# Patient Record
Sex: MALE | Race: WHITE | NOT HISPANIC OR LATINO | Employment: UNEMPLOYED | URBAN - METROPOLITAN AREA
[De-identification: names, ages, dates, MRNs, and addresses within clinical notes are randomized per-mention and may not be internally consistent; named-entity substitution may affect disease eponyms.]

---

## 2018-09-24 LAB — PHQ9 SCORE: 13

## 2018-10-04 LAB — PHQ9 SCORE: 14

## 2018-10-12 LAB — PHQ9 SCORE: 14

## 2019-02-25 LAB — PHQ9 SCORE: 12

## 2019-03-06 LAB — PHQ9 SCORE: 13

## 2019-04-19 LAB — PHQ9 SCORE: 11

## 2019-05-10 LAB — PHQ9 SCORE: 12

## 2019-05-24 ENCOUNTER — TRANSFERRED RECORDS (OUTPATIENT)
Dept: HEALTH INFORMATION MANAGEMENT | Facility: CLINIC | Age: 34
End: 2019-05-24

## 2019-05-24 LAB — PHQ9 SCORE: 11

## 2019-05-28 DIAGNOSIS — G47.30 SLEEP APNEA: Primary | ICD-10-CM

## 2019-05-29 ENCOUNTER — OFFICE VISIT (OUTPATIENT)
Dept: FAMILY MEDICINE | Facility: CLINIC | Age: 34
End: 2019-05-29
Payer: MEDICARE

## 2019-05-29 VITALS
WEIGHT: 296 LBS | DIASTOLIC BLOOD PRESSURE: 77 MMHG | RESPIRATION RATE: 18 BRPM | HEIGHT: 69 IN | SYSTOLIC BLOOD PRESSURE: 127 MMHG | HEART RATE: 69 BPM | OXYGEN SATURATION: 96 % | TEMPERATURE: 97.9 F | BODY MASS INDEX: 43.84 KG/M2

## 2019-05-29 DIAGNOSIS — Z00.00 ROUTINE HISTORY AND PHYSICAL EXAMINATION OF ADULT: Primary | ICD-10-CM

## 2019-05-29 DIAGNOSIS — I10 ESSENTIAL HYPERTENSION: ICD-10-CM

## 2019-05-29 DIAGNOSIS — I73.00 RAYNAUD'S PHENOMENON WITHOUT GANGRENE: ICD-10-CM

## 2019-05-29 DIAGNOSIS — E66.01 MORBID OBESITY (H): ICD-10-CM

## 2019-05-29 DIAGNOSIS — J35.8 TONSIL STONE: ICD-10-CM

## 2019-05-29 DIAGNOSIS — R53.83 FATIGUE, UNSPECIFIED TYPE: ICD-10-CM

## 2019-05-29 DIAGNOSIS — F41.8 DEPRESSION WITH ANXIETY: ICD-10-CM

## 2019-05-29 DIAGNOSIS — H61.22 IMPACTED CERUMEN OF LEFT EAR: ICD-10-CM

## 2019-05-29 LAB
ALBUMIN SERPL-MCNC: 3.6 G/DL (ref 3.4–5)
ALP SERPL-CCNC: 98 U/L (ref 40–150)
ALT SERPL W P-5'-P-CCNC: 36 U/L (ref 0–70)
ANION GAP SERPL CALCULATED.3IONS-SCNC: 8 MMOL/L (ref 3–14)
AST SERPL W P-5'-P-CCNC: 16 U/L (ref 0–45)
BASOPHILS # BLD AUTO: 0 10E9/L (ref 0–0.2)
BASOPHILS NFR BLD AUTO: 0.2 %
BILIRUB SERPL-MCNC: 0.3 MG/DL (ref 0.2–1.3)
BUN SERPL-MCNC: 13 MG/DL (ref 7–30)
CALCIUM SERPL-MCNC: 8.6 MG/DL (ref 8.5–10.1)
CHLORIDE SERPL-SCNC: 106 MMOL/L (ref 94–109)
CHOLEST SERPL-MCNC: 115 MG/DL
CO2 SERPL-SCNC: 26 MMOL/L (ref 20–32)
CREAT SERPL-MCNC: 1.14 MG/DL (ref 0.66–1.25)
DIFFERENTIAL METHOD BLD: NORMAL
EOSINOPHIL # BLD AUTO: 0.3 10E9/L (ref 0–0.7)
EOSINOPHIL NFR BLD AUTO: 2.9 %
ERYTHROCYTE [DISTWIDTH] IN BLOOD BY AUTOMATED COUNT: 13.5 % (ref 10–15)
GFR SERPL CREATININE-BSD FRML MDRD: 85 ML/MIN/{1.73_M2}
GLUCOSE SERPL-MCNC: 94 MG/DL (ref 70–99)
HCT VFR BLD AUTO: 42 % (ref 40–53)
HDLC SERPL-MCNC: 31 MG/DL
HGB BLD-MCNC: 14.3 G/DL (ref 13.3–17.7)
LDLC SERPL CALC-MCNC: 58 MG/DL
LYMPHOCYTES # BLD AUTO: 2.8 10E9/L (ref 0.8–5.3)
LYMPHOCYTES NFR BLD AUTO: 30.4 %
MCH RBC QN AUTO: 30.2 PG (ref 26.5–33)
MCHC RBC AUTO-ENTMCNC: 34 G/DL (ref 31.5–36.5)
MCV RBC AUTO: 89 FL (ref 78–100)
MONOCYTES # BLD AUTO: 0.7 10E9/L (ref 0–1.3)
MONOCYTES NFR BLD AUTO: 7.5 %
NEUTROPHILS # BLD AUTO: 5.4 10E9/L (ref 1.6–8.3)
NEUTROPHILS NFR BLD AUTO: 59 %
NONHDLC SERPL-MCNC: 84 MG/DL
PLATELET # BLD AUTO: 246 10E9/L (ref 150–450)
POTASSIUM SERPL-SCNC: 3.9 MMOL/L (ref 3.4–5.3)
PROT SERPL-MCNC: 6.9 G/DL (ref 6.8–8.8)
RBC # BLD AUTO: 4.74 10E12/L (ref 4.4–5.9)
SODIUM SERPL-SCNC: 140 MMOL/L (ref 133–144)
T4 FREE SERPL-MCNC: 0.95 NG/DL (ref 0.76–1.46)
TRIGL SERPL-MCNC: 132 MG/DL
TSH SERPL DL<=0.005 MIU/L-ACNC: 4.86 MU/L (ref 0.4–4)
WBC # BLD AUTO: 9.2 10E9/L (ref 4–11)

## 2019-05-29 PROCEDURE — 99385 PREV VISIT NEW AGE 18-39: CPT | Performed by: INTERNAL MEDICINE

## 2019-05-29 PROCEDURE — 36415 COLL VENOUS BLD VENIPUNCTURE: CPT | Performed by: INTERNAL MEDICINE

## 2019-05-29 PROCEDURE — 80061 LIPID PANEL: CPT | Performed by: INTERNAL MEDICINE

## 2019-05-29 PROCEDURE — 84443 ASSAY THYROID STIM HORMONE: CPT | Performed by: INTERNAL MEDICINE

## 2019-05-29 PROCEDURE — 85025 COMPLETE CBC W/AUTO DIFF WBC: CPT | Performed by: INTERNAL MEDICINE

## 2019-05-29 PROCEDURE — 84439 ASSAY OF FREE THYROXINE: CPT | Performed by: INTERNAL MEDICINE

## 2019-05-29 PROCEDURE — 80053 COMPREHEN METABOLIC PANEL: CPT | Performed by: INTERNAL MEDICINE

## 2019-05-29 RX ORDER — ATORVASTATIN CALCIUM 40 MG/1
TABLET, FILM COATED ORAL
Refills: 5 | COMMUNITY
Start: 2019-02-14 | End: 2020-01-22

## 2019-05-29 RX ORDER — AMLODIPINE BESYLATE 5 MG/1
5 TABLET ORAL DAILY
Qty: 90 TABLET | Refills: 1 | Status: SHIPPED | OUTPATIENT
Start: 2019-05-29 | End: 2019-12-20

## 2019-05-29 ASSESSMENT — ENCOUNTER SYMPTOMS
VOMITING: 0
COUGH: 0
MYALGIAS: 0
DYSPHORIC MOOD: 1
HEADACHES: 0
DIFFICULTY URINATING: 0
TROUBLE SWALLOWING: 0
CONSTIPATION: 0
BACK PAIN: 0
SLEEP DISTURBANCE: 0
NECK PAIN: 0
FEVER: 0
PALPITATIONS: 1
LIGHT-HEADEDNESS: 0
ABDOMINAL PAIN: 0
SORE THROAT: 0
CHILLS: 0
NUMBNESS: 0
SHORTNESS OF BREATH: 0
DIZZINESS: 1
DIARRHEA: 0
NAUSEA: 0
EYE PAIN: 0
FATIGUE: 1
NERVOUS/ANXIOUS: 1
BLOOD IN STOOL: 0
ARTHRALGIAS: 0

## 2019-05-29 ASSESSMENT — PATIENT HEALTH QUESTIONNAIRE - PHQ9: SUM OF ALL RESPONSES TO PHQ QUESTIONS 1-9: 9

## 2019-05-29 ASSESSMENT — ACTIVITIES OF DAILY LIVING (ADL): CURRENT_FUNCTION: NO ASSISTANCE NEEDED

## 2019-05-29 ASSESSMENT — MIFFLIN-ST. JEOR: SCORE: 2283.03

## 2019-05-29 NOTE — LETTER
My Depression Action Plan  Name: Juanjo Silverio   Date of Birth 1985  Date: 5/29/2019    My doctor: Filemon Peraza   My clinic: [facility]  [address] [facility] [id number] [phone]          GREEN    ZONE   Good Control    What it looks like:     Things are going generally well. You have normal up s and down s. You may even feel depressed from time to time, but bad moods usually last less than a day.   What you need to do:  1. Continue to care for yourself (see self care plan)  2. Check your depression survival kit and update it as needed  3. Follow your physician s recommendations including any medication.  4. Do not stop taking medication unless you consult with your physician first.           YELLOW         ZONE Getting Worse    What it looks like:     Depression is starting to interfere with your life.     It may be hard to get out of bed; you may be starting to isolate yourself from others.    Symptoms of depression are starting to last most all day and this has happened for several days.     You may have suicidal thoughts but they are not constant.   What you need to do:     1. Call your care team, your response to treatment will improve if you keep your care team informed of your progress. Yellow periods are signs an adjustment may need to be made.     2. Continue your self-care, even if you have to fake it!    3. Talk to someone in your support network    4. Open up your depression survival kit           RED    ZONE Medical Alert - Get Help    What it looks like:     Depression is seriously interfering with your life.     You may experience these or other symptoms: You can t get out of bed most days, can t work or engage in other necessary activities, you have trouble taking care of basic hygiene, or basic responsibilities, thoughts of suicide or death that will not go away, self-injurious behavior.     What you need to do:  1. Call your care  team and request a same-day appointment. If they are not available (weekends or after hours) call your local crisis line, emergency room or 911.            Depression Self Care Plan / Survival Kit    Self-Care for Depression  Here s the deal. Your body and mind are really not as separate as most people think.  What you do and think affects how you feel and how you feel influences what you do and think. This means if you do things that people who feel good do, it will help you feel better.  Sometimes this is all it takes.  There is also a place for medication and therapy depending on how severe your depression is, so be sure to consult with your medical provider and/ or Behavioral Health Consultant if your symptoms are worsening or not improving.     In order to better manage my stress, I will:    Exercise  Get some form of exercise, every day. This will help reduce pain and release endorphins, the  feel good  chemicals in your brain. This is almost as good as taking antidepressants!  This is not the same as joining a gym and then never going! (they count on that by the way ) It can be as simple as just going for a walk or doing some gardening, anything that will get you moving.      Hygiene   Maintain good hygiene (Get out of bed in the morning, Make your bed, Brush your teeth, Take a shower, and Get dressed like you were going to work, even if you are unemployed).  If your clothes don't fit try to get ones that do.    Diet  I will strive to eat foods that are good for me, drink plenty of water, and avoid excessive sugar, caffeine, alcohol, and other mood-altering substances.  Some foods that are helpful in depression are: complex carbohydrates, B vitamins, flaxseed, fish or fish oil, fresh fruits and vegetables.    Psychotherapy  I agree to participate in Individual Therapy (if recommended).    Medication  If prescribed medications, I agree to take them.  Missing doses can result in serious side effects.  I  understand that drinking alcohol, or other illicit drug use, may cause potential side effects.  I will not stop my medication abruptly without first discussing it with my provider.    Staying Connected With Others  I will stay in touch with my friends, family members, and my primary care provider/team.    Use your imagination  Be creative.  We all have a creative side; it doesn t matter if it s oil painting, sand castles, or mud pies! This will also kick up the endorphins.    Witness Beauty  (AKA stop and smell the roses) Take a look outside, even in mid-winter. Notice colors, textures. Watch the squirrels and birds.     Service to others  Be of service to others.  There is always someone else in need.  By helping others we can  get out of ourselves  and remember the really important things.  This also provides opportunities for practicing all the other parts of the program.    Humor  Laugh and be silly!  Adjust your TV habits for less news and crime-drama and more comedy.    Control your stress  Try breathing deep, massage therapy, biofeedback, and meditation. Find time to relax each day.     My support system    Clinic Contact:  Phone number:    Contact 1:  Phone number:    Contact 2:  Phone number:    Faith/:  Phone number:    Therapist:  Phone number:    Local crisis center:    Phone number:    Other community support:  Phone number:

## 2019-05-29 NOTE — PATIENT INSTRUCTIONS
Monitor your blood pressure twice a day (once you wake up and before bedtime).  Call doctor if:  -- your blood pressure for the top/upper number is greater than 140 or less than 90  -- your blood pressure for the bottom/lower number is greater than 90 or less than 60    Write your blood pressure readings on a small notebook and bring this notebook along with your blood pressure machine to your clinic visits.    Proceed with your Sleep Study.    Maintain low fat/calorie diet and regular exercise.    Limit sodium intake to 2500mg per day.    Use over the counter earwax softener (ex. Debrox) as directed on the box. Do not insert any Qtips or any other object into your ears. Return to clinic for a nurse-only visit 1 week after for ear flushing (do not attempt to clean your ears out on your own).    Follow up yearly or as needed.        Patient Education   Personalized Prevention Plan  You are due for the preventive services outlined below.  Your care team is available to assist you in scheduling these services.  If you have already completed any of these items, please share that information with your care team to update in your medical record.  Health Maintenance Due   Topic Date Due     Diptheria Tetanus Pertussis (DTAP/TDAP/TD) Vaccine (3 - Td) 06/10/2016     PHQ-2  01/01/2019        Patient Education   Personalized Prevention Plan  You are due for the preventive services outlined below.  Your care team is available to assist you in scheduling these services.  If you have already completed any of these items, please share that information with your care team to update in your medical record.  Health Maintenance Due   Topic Date Due     Diptheria Tetanus Pertussis (DTAP/TDAP/TD) Vaccine (3 - Td) 06/10/2016     PHQ-2  01/01/2019     Your Health Risk Assessment indicates you feel you are not in good health    A healthy lifestyle helps keep the body fit and the mind alert. It helps protect you from disease, helps you  fight disease, and helps prevent chronic disease (disease that doesn't go away) from getting worse. This is important as you get older and begin to notice twinges in muscles and joints and a decline in the strength and stamina you once took for granted. A healthy lifestyle includes good healthcare, good nutrition, weight control, recreation, and regular exercise. Avoid harmful substances and do what you can to keep safe. Another part of a healthy lifestyle is stay mentally active and socially involved.    Good healthcare     Have a wellness visit every year.     If you have new symptoms, let us know right away. Don't wait until the next checkup.     Take medicines exactly as prescribed and keep your medicines in a safe place. Tell us if your medicine causes problems.   Healthy diet and weight control     Eat 3 or 4 small, nutritious, low-fat, high-fiber meals a day. Include a variety of fruits, vegetables, and whole-grain foods.     Make sure you get enough calcium in your diet. Calcium, vitamin D, and exercise help prevent osteoporosis (bone thinning).     If you live alone, try eating with others when you can. That way you get a good meal and have company while you eat it.     Try to keep a healthy weight. If you eat more calories than your body uses for energy, it will be stored as fat and you will gain weight.     Recreation   Recreation is not limited to sports and team events. It includes any activity that provides relaxation, interest, enjoyment, and exercise. Recreation provides an outlet for physical, mental, and social energy. It can give a sense of worth and achievement. It can help you stay healthy.    Mental Exercise and Social Involvement  Mental and emotional health is as important as physical health. Keep in touch with friends and family. Stay as active as possible. Continue to learn and challenge yourself.   Things you can do to stay mentally active are:    Learn something new, like a foreign  language or musical instrument.     Play SCRABBLE or do crossword puzzles. If you cannot find people to play these games with you at home, you can play them with others on your computer through the Internet.     Join a games club--anything from card games to chess or checkers or lawn bowling.     Start a new hobby.     Go back to school.     Volunteer.     Read.   Keep up with world events.    Exercise for a Healthier Heart     Exercise with a friend. When activity is fun, you're more likely to stick with it.   You may wonder how you can improve the health of your heart. If you re thinking about exercise, you re on the right track. You don t need to become an athlete, but you do need a certain amount of brisk exercise to help strengthen your heart. If you have been diagnosed with a heart condition, your doctor may recommend exercise to help stabilize your condition. To help make exercise a habit, choose safe, fun activities.  Be sure to check with your healthcare provider before starting an exercise program.   Why exercise?  Exercising regularly offers many healthy rewards. It can help you do all of the following:    Improve your blood cholesterol level to help prevent further heart trouble    Lower your blood pressure to help prevent a stroke or heart attack    Control diabetes, or reduce your risk of getting this disease    Improve your heart and lung function    Reach and maintain a healthy weight    Make your muscles stronger and more limber so you can stay active    Prevent falls and fractures by slowing the loss of bone mass (osteoporosis)    Manage stress better    Reduce your blood pressure    Improve your sense of self and your body image  Exercise tips  Ease into your routine. Set small goals. Then build on them.  Exercise on most days. Aim for a total of 150 or more minutes of moderate to  vigorous intensity activity each week. Consider 40 minutes, 3 to 4 times a week. For best results, activity should  last for 40 minutes on average. It is OK to work up to the 40 minute period over time. Examples of moderate-intensity activity is walking 1 mile in 15 minutes or 30 to 45 minutes of yard work.  Step up your daily activity level. Along with your exercise program, try being more active throughout the day. Walk instead of drive. Do more household tasks or yard work.  Choose one or more activities you enjoy. Walking is one of the easiest things you can do. You can also try swimming, riding a bike, dancing, or taking an exercise class.  Stop exercising and call your doctor if you:    Have chest pain or feel dizzy or lightheaded    Feel burning, tightness, pressure, or heaviness in your chest, neck, shoulders, back, or arms    Have unusual shortness of breath    Have increased joint or muscle pain    Have palpitations or an irregular heartbeat   Date Last Reviewed: 5/1/2016 2000-2018 [employer]. [address] [place] [address]. All rights reserved. This information is not intended as a substitute for professional medical care. Always follow your healthcare professional's instructions.          Understanding USDA MyPlate  The USDA (U.S. Department of Agriculture) has guidelines to help you make healthy food choices. These are called MyPlate. MyPlate shows the food groups that make up healthy meals using the image of a place setting. Before you eat, think about the healthiest choices for what to put onto your plate or into your cup or bowl. To learn more about building a healthy plate, visit www.choosemyplate.gov.    The food groups    Fruits. Any fruit or 100% fruit juice counts as part of the Fruit Group. Fruits may be fresh, canned, frozen, or dried, and may be whole, cut-up, or pureed. Make half your plate fruits and vegetables.    Vegetables. Any vegetable or 100% vegetable juice counts as a member of the Vegetable Group. Vegetables may be fresh, frozen, canned, or dried. They can be served  raw or cooked and may be whole, cut-up, or mashed. Make half your plate fruits and vegetables.    Grains. All foods made from grains are part of the Grains Group. These include wheat, rice, oats, cornmeal, and barley such as bread, pasta, oatmeal, cereal, tortillas, and grits. Grains should be no more than a quarter of your plate. At least half of your grains should be whole grains.    Protein. This group includes meat, poultry, seafood, beans and peas, eggs, processed soy products (like tofu), nuts (including nut butters), and seeds. Make protein choices no more than a quarter of your plate. Meat and poultry choices should be lean or low fat.    Dairy. All fluid milk products and foods made from milk that contain calcium, like yogurt and cheese, are part of the Dairy Group. (Foods that have little calcium, such as cream, butter, and cream cheese, are not part of the group.) Most dairy choices should be low-fat or fat-free.    Oils. These are fats that are liquid at room temperature. They include canola, corn, olive, soybean, and sunflower oil. Foods that are mainly oil include mayonnaise, certain salad dressings, and soft margarines. You should have only 5 to 7 teaspoons of oils a day. You probably already get this much from the food you eat.  Date Last Reviewed: 8/1/2017 2000-2018 [employer]. [address] [place] [address]. All rights reserved. This information is not intended as a substitute for professional medical care. Always follow your healthcare professional's instructions.        Your Health Risk Assessment indicates you feel you are not in good emotional health.    Recreation   Recreation is not limited to sports and team events. It includes any activity that provides relaxation, interest, enjoyment, and exercise. Recreation provides an outlet for physical, mental, and social energy. It can give a sense of worth and achievement. It can help you stay healthy.    Mental Exercise  and Social Involvement  Mental and emotional health is as important as physical health. Keep in touch with friends and family. Stay as active as possible. Continue to learn and challenge yourself.   Things you can do to stay mentally active are:    Learn something new, like a foreign language or musical instrument.     Play SCRABBLE or do crossword puzzles. If you cannot find people to play these games with you at home, you can play them with others on your computer through the Internet.     Join a games club--anything from card games to chess or checkers or lawn bowling.     Start a new hobby.     Go back to school.     Volunteer.     Read.   Keep up with world events.     Patient Education     Tips for a Low-Sodium Diet  If you have high blood pressure, heart failure, liver problems or kidney problems, you may need to watch your sodium intake. Too much sodium can cause thirst and shortness of breath. It can also make your body retain extra fluids.  You should limit the amount of sodium in your diet to mg per day.  Sodium is found in many foods. Most of our sodium comes from  processed  foods like canned soups, lunch meats and TV dinners.  A major source of sodium is salt, or sodium chloride. Salt is often used to preserve foods (extend their shelf life). We have gotten used to the taste of salt in our foods. When you start to limit your salt intake, you will notice the lack of salt. Give yourself time to adjust to the change.  Tips    To keep track of your sodium intake, write down the amount of sodium you eat for a of couple days. This gives you a good idea of which foods are high in sodium--and where you can cut back.    Do not add salt while cooking or at the table. In recipes, you can often use half the amount of salt without giving up flavor.    Do not add salt to water when making rice, pasta or potatoes.    Do not use lemon pepper--this is made with salt.    Use spices and herbs without the word  salt  in  their names. Use herb blends like Mrs. Shen.    When eating vegetables, meats, poultry or fish, choose fresh or frozen instead of canned foods.    Eat more homemade foods that are made from scratch. Avoid boxed rice, noodles or potato dishes--these often contain salty seasonings.    Choose foods with the least amount of packaging. These are often lower in sodium .    Read food labels to learn the sodium content for suggested serving sizes. Choose foods with the least amount of sodium.  ? Choose foods labeled  low sodium.  These have less than 140 mg of sodium per serving.    Always double-check serving sizes. If you eat two servings of a food, you will get twice as much sodium.    When you go out to eat, ask that foods be made without added salt. Ask for condiments on the side, so you can control how much you use.    You will find foods with less sodium if you shop along the outer walls of the grocery store.    Do not use a salt substitute without your doctor s okay. Some products (like New Lite Salt) contain potassium. If you are taking certain medicines, these products could raise the level of potassium in your blood.  High-sodium foods    Salt or kosher salt (one teaspoon = 2,300 mg of sodium)    Seasonings (lemon pepper, garlic salt, sea salt, seasoning salt, etc.)    Meat tenderizers and marinades    Packaged sauces or gravies    Snack foods (chips, crackers, pork rinds, salted nuts)    Cheese (natural and processed)    Cottage cheese    Fast-food and restaurant meals    Most canned vegetables and vegetable juices    Pickled and cured foods (pickles, olives, sauerkraut)    Condiments (BBQ sauce, soy sauce, teriyaki sauce, steak sauce, salad dressing, ketchup, etc.)    Canned or boxed side dishes, such as macaroni and cheese, ramen noodles, [employer] [place] [place] and Rice-A-Josr    Frozen meals with more than 500 mg of sodium per serving    Monosodium glutamate (MSG)    Processed meats (bologna, ham, wiseman) and  canned meats (SPAM, corned beef)    Soups and bouillon (canned, frozen or dried)    Canned tomato products (juice, spaghetti sauce, etc.)    Sports drinks such as Gatorade or Powerade    Foods covered in sauce, gravy or other coatings (broccoli with cheese sauce, chicken fingers, etc.)    Biscuits and refrigerated rolls or breads

## 2019-05-29 NOTE — PROGRESS NOTES
"  SUBJECTIVE:   Juanjo Silverio is a 34 year old male who presents for Preventive Visit.  {PVP to remind patient that this is not necessarily a physical exam; physical exam may or may not be done:434076::\"click delete button to remove this line now\"}  {PVP to inform patient that additional E&M charge may apply, if additional problems addressed:467646::\"click delete button to remove this line now\"}  Are you in the first 12 months of your Medicare Part B coverage?  { :644098::\"No\"}    Physical Health:    In general, how would you rate your overall physical health? { :009797}    Outside of work, how many days during the week do you exercise? { :294584}    Outside of work, approximately how many minutes a day do you exercise?{ :959537}    If you drink alcohol do you typically have >3 drinks per day or >7 drinks per week? { :515181}    Do you usually eat at least 4 servings of fruit and vegetables a day, include whole grains & fiber and avoid regularly eating high fat or \"junk\" foods? { :656910::\"Yes\"}    Do you have any problems taking medications regularly?  { :654204::\"No\"}    Do you have any side effects from medications? { :047251}    Needs assistance for the following daily activities: { :353856}    Which of the following safety concerns are present in your home?  { :505345::\"none identified\"}     Hearing impairment: { :241783}    In the past 6 months, have you been bothered by leaking of urine? { :681713}    Mental Health:    In general, how would you rate your overall mental or emotional health? { :537080}  PHQ-2 Score:      Do you feel safe in your environment? { :173219}    Do you have a Health Care Directive? { :851683}    Additional concerns to address?  {If YES, notify patient they may be responsible for a copay, coinsurance or deductible if the visit today includes services such as checking on a sore throat, having an x-ray or lab test, or treating and evaluating a new or existing condition " ":214800::\"No\"}    Fall risk:  { :327300}  {If any of the above assessments are answered yes, consider ordering appropriate referrals (Optional):524935::\"click delete button to remove this line now\"}  Cognitive Screening: { :979948}    {Do you have sleep apnea, excessive snoring or daytime drowsiness? (Optional):195860}    {Outside tests to abstract? :412842}    {additional problems to add (Optional):581169}    Reviewed and updated as needed this visit by clinical staff         Reviewed and updated as needed this visit by Provider        Social History     Tobacco Use     Smoking status: Former Smoker     Packs/day: 0.50     Last attempt to quit: 2008     Years since quittin.3     Smokeless tobacco: Never Used   Substance Use Topics     Alcohol use: Yes     Comment: very occasionally                           Current providers sharing in care for this patient include: {Rooming staff:  Please update Care Team in Rooming Activity, refresh this note and then delete this statement}  Patient Care Team:  Li Peraza as PCP - General    The following health maintenance items are reviewed in Epic and correct as of today:  Health Maintenance   Topic Date Due     DTAP/TDAP/TD IMMUNIZATION (3 - Td) 06/10/2016     PHQ-2  2019     ZOSTER IMMUNIZATION (1 of 2) 2037     MEDICARE ANNUAL WELLNESS VISIT  2052     HIV SCREENING  Completed     INFLUENZA VACCINE  Completed     IPV IMMUNIZATION  Completed     MENINGITIS IMMUNIZATION  Completed     {Chronicprobdata (Optional):361272}  {Decision Support (Optional):149991}    ROS:  {ROS COMP:849607}    OBJECTIVE:   There were no vitals taken for this visit. Estimated body mass index is 32.49 kg/m  as calculated from the following:    Height as of 13: 1.753 m (5' 9\").    Weight as of 14: 99.8 kg (220 lb).  EXAM:   {Exam :409898}    {Diagnostic Test Results (Optional):584654::\"Diagnostic Test Results:\",\"Labs reviewed in " "Epic\"}    ASSESSMENT / PLAN:   {Diag Picklist:850698}    End of Life Planning:  Patient currently has an advanced directive: { :179176}    COUNSELING:  {Medicare Counselin}    Estimated body mass index is 32.49 kg/m  as calculated from the following:    Height as of 13: 1.753 m (5' 9\").    Weight as of 14: 99.8 kg (220 lb).    {Weight Management Plan (ACO) Complete if BMI is abnormal-  Ages 18-64  BMI >24.9.  Age 65+ with BMI <23 or >30 (Optional):414891}     reports that he quit smoking about 11 years ago. He smoked 0.50 packs per day. He has never used smokeless tobacco.  {Tobacco Cessation -- Complete if patient is a smoker (Optional):400701}    Appropriate preventive services were discussed with this patient, including applicable screening as appropriate for cardiovascular disease, diabetes, osteopenia/osteoporosis, and glaucoma.  As appropriate for age/gender, discussed screening for colorectal cancer, prostate cancer, breast cancer, and cervical cancer. Checklist reviewing preventive services available has been given to the patient.    Reviewed patients plan of care and provided an AVS. The {CarePlan:952015} for Juanjo meets the Care Plan requirement. This Care Plan has been established and reviewed with the {PATIENT, FAMILY MEMBER, CAREGIVER:286345}.    Counseling Resources:  ATP IV Guidelines  Pooled Cohorts Equation Calculator  Breast Cancer Risk Calculator  FRAX Risk Assessment  ICSI Preventive Guidelines  Dietary Guidelines for Americans,   USDA's MyPlate  ASA Prophylaxis  Lung CA Screening    Kiran Giron MD  [facility]  "

## 2019-05-29 NOTE — PROGRESS NOTES
The patient was provided with suggestions to help him develop a healthy physical lifestyle.  He is at risk for lack of exercise and has been provided with information to increase physical activity for the benefit of his well-being.  The patient was counseled and encouraged to consider modifying their diet and eating habits. He was provided with information on recommended healthy diet options.  The patient was provided with suggestions to help him develop a healthy emotional lifestyle.

## 2019-05-29 NOTE — PROGRESS NOTES
"SUBJECTIVE:   Juanjo Silverio is a 34 year old male who presents for Preventive Visit.    Patient has been feeling more fatigued than usual.  No unusual symptoms observed other than palor of his fingers when exposed to cold.  Denies joints pains, rashes, weight loss.  He does have depression which as per PHQ9 is mild.  He is on Zoloft and also takes Trazodone at bedtime.  He does wake up not feeling rested and experiences daytime sleepiness.    Hypertension is controlled on Amlodipine.    Has noticed some whitish spots at his tonsils.  Denies sore throat or problems with swallowing.      Healthy Habits:    In general, how would you rate your overall health?  Fair    Frequency of exercise:  1 day/week    Duration of exercise:  15-30 minutes    Do you usually eat at least 4 servings of fruit and vegetables a day, include whole grains    & fiber and avoid regularly eating high fat or \"junk\" foods?  No    Taking medications regularly:  Yes    Barriers to taking medications:  Cost of medication    Medication side effects:  Not applicable    Ability to successfully perform activities of daily living:  No assistance needed    Home Safety:  No safety concerns identified    Hearing Impairment:  No hearing concerns    In the past 6 months, have you been bothered by leaking of urine?  No    In general, how would you rate your overall mental or emotional health?  Fair      PHQ-2 Total Score:    Additional concerns today:  Yes    Do you feel safe in your environment? Yes    Do you have a Health Care Directive? No: Advance care planning reviewed with patient; information given to patient to review.      Fall risk       Cognitive Screening   1) Repeat 3 items (Leader, Season, Table)    2) Clock draw: NORMAL  3) 3 item recall: Recalls 3 objects  Results: 3 items recalled: COGNITIVE IMPAIRMENT LESS LIKELY    Mini-CogTM Copyright NAVDEEP Vasquez. Licensed by the author for use in [facility]; reprinted with permission " (ryan@[email or web address]). All rights reserved.      Do you have sleep apnea, excessive snoring or daytime drowsiness?: yes    Reviewed and updated as needed this visit by clinical staff  Tobacco  Allergies  Meds  Problems  Med Hx  Surg Hx  Fam Hx         Reviewed and updated as needed this visit by Provider  Allergies  Meds  Problems  Med Hx  Surg Hx        Social History     Tobacco Use     Smoking status: Former Smoker     Packs/day: 0.50     Last attempt to quit: 2008     Years since quittin.4     Smokeless tobacco: Never Used   Substance Use Topics     Alcohol use: Yes     Comment: very occasionally       If you drink alcohol do you typically have >3 drinks per day or >7 drinks per week? No      Current providers sharing in care for this patient include:   Patient Care Team:  Li Peraza as PCP - General    The following health maintenance items are reviewed in Epic and correct as of today:  Health Maintenance   Topic Date Due     DTAP/TDAP/TD IMMUNIZATION (3 - Td) 06/10/2016     PHQ-9  2019     ZOSTER IMMUNIZATION (1 of 2) 2037     MEDICARE ANNUAL WELLNESS VISIT  2052     HIV SCREENING  Completed     DEPRESSION ACTION PLAN  Completed     PHQ-2  Completed     INFLUENZA VACCINE  Completed     IPV IMMUNIZATION  Completed     MENINGITIS IMMUNIZATION  Completed       Past Medical History:   Diagnosis Date     Depression with anxiety      Mood disorder (H) 6/10/2013     Other acne      Schizophrenia (H)      Suicidal ideation 2013       Past Surgical History:   Procedure Laterality Date     C APPENDECTOMY  11-    viral, not supperative at surgery     CIRCUMCISION      Adult 2013       Family History   Problem Relation Age of Onset     C.A.D. Maternal Grandfather         MI @ 43     Coronary Artery Disease Maternal Grandfather      Anxiety Disorder Father      Hypertension Father      Neurologic Disorder Mother         MS     Thyroid Disease Mother      "    hypothyroid     Coronary Artery Disease Mother         had heart attack and passed away from it     Schizophrenia Paternal Uncle      Bipolar Disorder Paternal Uncle      Substance Abuse Maternal Uncle      Diabetes No family hx of      Cerebrovascular Disease No family hx of      Colon Cancer No family hx of      Prostate Cancer No family hx of        Review of Systems   Constitutional: Positive for fatigue. Negative for chills and fever.   HENT: Negative for congestion, ear pain, hearing loss, sore throat and trouble swallowing.    Eyes: Negative for pain and visual disturbance.   Respiratory: Negative for cough and shortness of breath.    Cardiovascular: Positive for palpitations (when anxious). Negative for chest pain.   Gastrointestinal: Negative for abdominal pain, blood in stool, constipation, diarrhea, nausea and vomiting.   Genitourinary: Negative for difficulty urinating and testicular pain.   Musculoskeletal: Negative for arthralgias, back pain, myalgias and neck pain.   Skin: Negative for rash.   Neurological: Positive for dizziness (as part of withdrawal fom his psyche meds). Negative for light-headedness, numbness and headaches.   Psychiatric/Behavioral: Positive for dysphoric mood. Negative for sleep disturbance and suicidal ideas. The patient is nervous/anxious.        OBJECTIVE:   /77   Pulse 69   Temp 97.9  F (36.6  C) (Oral)   Resp 18   Ht 1.753 m (5' 9\")   Wt 134.3 kg (296 lb)   SpO2 96%   BMI 43.71 kg/m   Estimated body mass index is 43.71 kg/m  as calculated from the following:    Height as of this encounter: 1.753 m (5' 9\").    Weight as of this encounter: 134.3 kg (296 lb).    Physical Exam   Constitutional: He is oriented to person, place, and time. No distress.   HENT:   Right Ear: External ear normal.   Mouth/Throat: No oropharyngeal exudate.   Impacted cerumen left ear -- hard and deep earwax build up; tonsilliths seen   Eyes: Pupils are equal, round, and reactive to " light. Conjunctivae are normal.   Neck: No thyromegaly present.   Cardiovascular: Normal rate, regular rhythm and normal heart sounds.   Pulmonary/Chest: Effort normal and breath sounds normal. No respiratory distress.   Abdominal: Soft. There is no tenderness.   Genitourinary: No discharge found.   Musculoskeletal: Normal range of motion. He exhibits no edema or tenderness.   Lymphadenopathy:     He has no cervical adenopathy.   Neurological: He is alert and oriented to person, place, and time. Coordination normal.   Skin: No rash noted. No erythema. No pallor.   Psychiatric: He has a normal mood and affect.   Nursing note and vitals reviewed.      ASSESSMENT / PLAN:       ICD-10-CM    1. Routine history and physical examination of adult Z00.00    2. Essential hypertension I10 amLODIPine (NORVASC) 5 MG tablet   3. Depression with anxiety F41.8 sertraline (ZOLOFT) 50 MG tablet     TSH with free T4 reflex     DEPRESSION ACTION PLAN (DAP)   4. Fatigue, unspecified type R53.83 CBC with platelets differential     Comprehensive metabolic panel     TSH with free T4 reflex     T4 free     T4 free   5. Raynaud's phenomenon (by history or observed) -- no other sx's of connective tissue d/o I73.00     will pursue work up if preliminary tests are unremarkable   6. Tonsil stone -- explained to patient and reassurence given that it's not a serious condition J35.8    7. Impacted cerumen of left ear H61.22    8. Morbid obesity (H) E66.01 Lipid panel reflex to direct LDL Fasting       Patient Instructions     Monitor your blood pressure twice a day (once you wake up and before bedtime).  Call doctor if:  -- your blood pressure for the top/upper number is greater than 140 or less than 90  -- your blood pressure for the bottom/lower number is greater than 90 or less than 60    Write your blood pressure readings on a small notebook and bring this notebook along with your blood pressure machine to your clinic visits.    Proceed with  your Sleep Study.    Maintain low fat/calorie diet and regular exercise.    Limit sodium intake to 2500mg per day.    Use over the counter earwax softener (ex. Debrox) as directed on the box. Do not insert any Qtips or any other object into your ears. Return to clinic for a nurse-only visit 1 week after for ear flushing (do not attempt to clean your ears out on your own).    Follow up yearly or as needed.        Patient Education   Personalized Prevention Plan  You are due for the preventive services outlined below.  Your care team is available to assist you in scheduling these services.  If you have already completed any of these items, please share that information with your care team to update in your medical record.  Health Maintenance Due   Topic Date Due     Diptheria Tetanus Pertussis (DTAP/TDAP/TD) Vaccine (3 - Td) 06/10/2016     PHQ-2  01/01/2019        Patient Education   Personalized Prevention Plan  You are due for the preventive services outlined below.  Your care team is available to assist you in scheduling these services.  If you have already completed any of these items, please share that information with your care team to update in your medical record.  Health Maintenance Due   Topic Date Due     Diptheria Tetanus Pertussis (DTAP/TDAP/TD) Vaccine (3 - Td) 06/10/2016     PHQ-2  01/01/2019     Your Health Risk Assessment indicates you feel you are not in good health    A healthy lifestyle helps keep the body fit and the mind alert. It helps protect you from disease, helps you fight disease, and helps prevent chronic disease (disease that doesn't go away) from getting worse. This is important as you get older and begin to notice twinges in muscles and joints and a decline in the strength and stamina you once took for granted. A healthy lifestyle includes good healthcare, good nutrition, weight control, recreation, and regular exercise. Avoid harmful substances and do what you can to keep safe. Another  part of a healthy lifestyle is stay mentally active and socially involved.    Good healthcare     Have a wellness visit every year.     If you have new symptoms, let us know right away. Don't wait until the next checkup.     Take medicines exactly as prescribed and keep your medicines in a safe place. Tell us if your medicine causes problems.   Healthy diet and weight control     Eat 3 or 4 small, nutritious, low-fat, high-fiber meals a day. Include a variety of fruits, vegetables, and whole-grain foods.     Make sure you get enough calcium in your diet. Calcium, vitamin D, and exercise help prevent osteoporosis (bone thinning).     If you live alone, try eating with others when you can. That way you get a good meal and have company while you eat it.     Try to keep a healthy weight. If you eat more calories than your body uses for energy, it will be stored as fat and you will gain weight.     Recreation   Recreation is not limited to sports and team events. It includes any activity that provides relaxation, interest, enjoyment, and exercise. Recreation provides an outlet for physical, mental, and social energy. It can give a sense of worth and achievement. It can help you stay healthy.    Mental Exercise and Social Involvement  Mental and emotional health is as important as physical health. Keep in touch with friends and family. Stay as active as possible. Continue to learn and challenge yourself.   Things you can do to stay mentally active are:    Learn something new, like a foreign language or musical instrument.     Play SCRABBLE or do crossword puzzles. If you cannot find people to play these games with you at home, you can play them with others on your computer through the Internet.     Join a games club--anything from card games to chess or checkers or lawn bowling.     Start a new hobby.     Go back to school.     Volunteer.     Read.   Keep up with world events.    Exercise for a Healthier Heart      Exercise with a friend. When activity is fun, you're more likely to stick with it.   You may wonder how you can improve the health of your heart. If you re thinking about exercise, you re on the right track. You don t need to become an athlete, but you do need a certain amount of brisk exercise to help strengthen your heart. If you have been diagnosed with a heart condition, your doctor may recommend exercise to help stabilize your condition. To help make exercise a habit, choose safe, fun activities.  Be sure to check with your healthcare provider before starting an exercise program.   Why exercise?  Exercising regularly offers many healthy rewards. It can help you do all of the following:    Improve your blood cholesterol level to help prevent further heart trouble    Lower your blood pressure to help prevent a stroke or heart attack    Control diabetes, or reduce your risk of getting this disease    Improve your heart and lung function    Reach and maintain a healthy weight    Make your muscles stronger and more limber so you can stay active    Prevent falls and fractures by slowing the loss of bone mass (osteoporosis)    Manage stress better    Reduce your blood pressure    Improve your sense of self and your body image  Exercise tips  Ease into your routine. Set small goals. Then build on them.  Exercise on most days. Aim for a total of 150 or more minutes of moderate to  vigorous intensity activity each week. Consider 40 minutes, 3 to 4 times a week. For best results, activity should last for 40 minutes on average. It is OK to work up to the 40 minute period over time. Examples of moderate-intensity activity is walking 1 mile in 15 minutes or 30 to 45 minutes of yard work.  Step up your daily activity level. Along with your exercise program, try being more active throughout the day. Walk instead of drive. Do more household tasks or yard work.  Choose one or more activities you enjoy. Walking is one of the  easiest things you can do. You can also try swimming, riding a bike, dancing, or taking an exercise class.  Stop exercising and call your doctor if you:    Have chest pain or feel dizzy or lightheaded    Feel burning, tightness, pressure, or heaviness in your chest, neck, shoulders, back, or arms    Have unusual shortness of breath    Have increased joint or muscle pain    Have palpitations or an irregular heartbeat   Date Last Reviewed: 5/1/2016 2000-2018 The [employer]. [address] [place] [id number]. All rights reserved. This information is not intended as a substitute for professional medical care. Always follow your healthcare professional's instructions.          Understanding USDA MyPlate  The USDA (U.S. Department of Agriculture) has guidelines to help you make healthy food choices. These are called MyPlate. MyPlate shows the food groups that make up healthy meals using the image of a place setting. Before you eat, think about the healthiest choices for what to put onto your plate or into your cup or bowl. To learn more about building a healthy plate, visit www.choosemyplate.gov.    The food groups    Fruits. Any fruit or 100% fruit juice counts as part of the Fruit Group. Fruits may be fresh, canned, frozen, or dried, and may be whole, cut-up, or pureed. Make half your plate fruits and vegetables.    Vegetables. Any vegetable or 100% vegetable juice counts as a member of the Vegetable Group. Vegetables may be fresh, frozen, canned, or dried. They can be served raw or cooked and may be whole, cut-up, or mashed. Make half your plate fruits and vegetables.    Grains. All foods made from grains are part of the Grains Group. These include wheat, rice, oats, cornmeal, and barley such as bread, pasta, oatmeal, cereal, tortillas, and grits. Grains should be no more than a quarter of your plate. At least half of your grains should be whole grains.    Protein. This group includes meat,  poultry, seafood, beans and peas, eggs, processed soy products (like tofu), nuts (including nut butters), and seeds. Make protein choices no more than a quarter of your plate. Meat and poultry choices should be lean or low fat.    Dairy. All fluid milk products and foods made from milk that contain calcium, like yogurt and cheese, are part of the Dairy Group. (Foods that have little calcium, such as cream, butter, and cream cheese, are not part of the group.) Most dairy choices should be low-fat or fat-free.    Oils. These are fats that are liquid at room temperature. They include canola, corn, olive, soybean, and sunflower oil. Foods that are mainly oil include mayonnaise, certain salad dressings, and soft margarines. You should have only 5 to 7 teaspoons of oils a day. You probably already get this much from the food you eat.  Date Last Reviewed: 8/1/2017 2000-2018 [employer]. [address] [place] [address]. All rights reserved. This information is not intended as a substitute for professional medical care. Always follow your healthcare professional's instructions.        Your Health Risk Assessment indicates you feel you are not in good emotional health.    Recreation   Recreation is not limited to sports and team events. It includes any activity that provides relaxation, interest, enjoyment, and exercise. Recreation provides an outlet for physical, mental, and social energy. It can give a sense of worth and achievement. It can help you stay healthy.    Mental Exercise and Social Involvement  Mental and emotional health is as important as physical health. Keep in touch with friends and family. Stay as active as possible. Continue to learn and challenge yourself.   Things you can do to stay mentally active are:    Learn something new, like a foreign language or musical instrument.     Play SCRABBLE or do crossword puzzles. If you cannot find people to play these games with you at home,  you can play them with others on your computer through the Internet.     Join a games club--anything from card games to chess or checkers or lawn bowling.     Start a new hobby.     Go back to school.     Volunteer.     Read.   Keep up with world events.     Patient Education     Tips for a Low-Sodium Diet  If you have high blood pressure, heart failure, liver problems or kidney problems, you may need to watch your sodium intake. Too much sodium can cause thirst and shortness of breath. It can also make your body retain extra fluids.  You should limit the amount of sodium in your diet to mg per day.  Sodium is found in many foods. Most of our sodium comes from  processed  foods like canned soups, lunch meats and TV dinners.  A major source of sodium is salt, or sodium chloride. Salt is often used to preserve foods (extend their shelf life). We have gotten used to the taste of salt in our foods. When you start to limit your salt intake, you will notice the lack of salt. Give yourself time to adjust to the change.  Tips    To keep track of your sodium intake, write down the amount of sodium you eat for a of couple days. This gives you a good idea of which foods are high in sodium--and where you can cut back.    Do not add salt while cooking or at the table. In recipes, you can often use half the amount of salt without giving up flavor.    Do not add salt to water when making rice, pasta or potatoes.    Do not use lemon pepper--this is made with salt.    Use spices and herbs without the word  salt  in their names. Use herb blends like Mrs. Shen.    When eating vegetables, meats, poultry or fish, choose fresh or frozen instead of canned foods.    Eat more homemade foods that are made from scratch. Avoid boxed rice, noodles or potato dishes--these often contain salty seasonings.    Choose foods with the least amount of packaging. These are often lower in sodium .    Read food labels to learn the sodium content for  suggested serving sizes. Choose foods with the least amount of sodium.  ? Choose foods labeled  low sodium.  These have less than 140 mg of sodium per serving.    Always double-check serving sizes. If you eat two servings of a food, you will get twice as much sodium.    When you go out to eat, ask that foods be made without added salt. Ask for condiments on the side, so you can control how much you use.    You will find foods with less sodium if you shop along the outer walls of the grocery store.    Do not use a salt substitute without your doctor s okay. Some products (like New Lite Salt) contain potassium. If you are taking certain medicines, these products could raise the level of potassium in your blood.  High-sodium foods    Salt or kosher salt (one teaspoon = 2,300 mg of sodium)    Seasonings (lemon pepper, garlic salt, sea salt, seasoning salt, etc.)    Meat tenderizers and marinades    Packaged sauces or gravies    Snack foods (chips, crackers, pork rinds, salted nuts)    Cheese (natural and processed)    Cottage cheese    Fast-food and restaurant meals    Most canned vegetables and vegetable juices    Pickled and cured foods (pickles, olives, sauerkraut)    Condiments (BBQ sauce, soy sauce, teriyaki sauce, steak sauce, salad dressing, ketchup, etc.)    Canned or boxed side dishes, such as macaroni and cheese, ramen noodles, Hamburger La Jose and Rice-A-Josr    Frozen meals with more than 500 mg of sodium per serving    Monosodium glutamate (MSG)    Processed meats (bologna, ham, wiseman) and canned meats (SPAM, corned beef)    Soups and bouillon (canned, frozen or dried)    Canned tomato products (juice, spaghetti sauce, etc.)    Sports drinks such as Gatorade or Powerade    Foods covered in sauce, gravy or other coatings (broccoli with cheese sauce, chicken fingers, etc.)    Biscuits and refrigerated rolls or breads           End of Life Planning:  Patient currently has an advanced directive:  "No      Estimated body mass index is 43.71 kg/m  as calculated from the following:    Height as of this encounter: 1.753 m (5' 9\").    Weight as of this encounter: 134.3 kg (296 lb).    Weight management plan: Discussed healthy diet and exercise guidelines     reports that he quit smoking about 11 years ago. He smoked 0.50 packs per day. He has never used smokeless tobacco.      Appropriate preventive services were discussed with this patient, including applicable screening as appropriate for cardiovascular disease, diabetes, osteopenia/osteoporosis, and glaucoma.  As appropriate for age/gender, discussed screening for colorectal cancer, prostate cancer, breast cancer, and cervical cancer. Checklist reviewing preventive services available has been given to the patient.    Reviewed patients plan of care and provided an AVS. The Basic Care Plan (routine screening as documented in Health Maintenance) for Juanjo meets the Care Plan requirement. This Care Plan has been established and reviewed with the Patient.    Counseling Resources:  ATP IV Guidelines  Pooled Cohorts Equation Calculator  Breast Cancer Risk Calculator  FRAX Risk Assessment  ICSI Preventive Guidelines  Dietary Guidelines for Americans, 2010  USDA's MyPlate  ASA Prophylaxis  Lung CA Screening    Kiran Giron MD  [facility]    Identified Health Risks:  "

## 2019-11-03 ENCOUNTER — NURSE TRIAGE (OUTPATIENT)
Dept: NURSING | Facility: CLINIC | Age: 34
End: 2019-11-03

## 2019-11-03 NOTE — TELEPHONE ENCOUNTER
Juanjo states 10 min ago he experienced a sudden pain in his lower back which made him collapse to the ground. States his legs are so weak he cannot stand up at all despite several attempts to do so. Did not lose consciousness. Alert/oriented x3.  Lives alone, no one there w/ him. Advised 911 now.     Reason for Disposition    Shock suspected (e.g., cold/pale/clammy skin, too weak to stand, low BP, rapid pulse)    Additional Information    Negative: Passed out (i.e., lost consciousness, collapsed and was not responding)    Protocols used: BACK PAIN-A-AH

## 2019-12-20 ENCOUNTER — OFFICE VISIT (OUTPATIENT)
Dept: FAMILY MEDICINE | Facility: CLINIC | Age: 34
End: 2019-12-20
Payer: MEDICARE

## 2019-12-20 VITALS
WEIGHT: 293.9 LBS | HEIGHT: 69 IN | SYSTOLIC BLOOD PRESSURE: 120 MMHG | BODY MASS INDEX: 43.53 KG/M2 | HEART RATE: 76 BPM | OXYGEN SATURATION: 96 % | TEMPERATURE: 98.5 F | DIASTOLIC BLOOD PRESSURE: 80 MMHG

## 2019-12-20 DIAGNOSIS — I10 ESSENTIAL HYPERTENSION: Primary | ICD-10-CM

## 2019-12-20 DIAGNOSIS — R07.9 CHEST PAIN, UNSPECIFIED TYPE: ICD-10-CM

## 2019-12-20 DIAGNOSIS — R53.83 FATIGUE, UNSPECIFIED TYPE: ICD-10-CM

## 2019-12-20 DIAGNOSIS — E66.01 MORBID OBESITY (H): ICD-10-CM

## 2019-12-20 DIAGNOSIS — Z23 NEED FOR PROPHYLACTIC VACCINATION AND INOCULATION AGAINST INFLUENZA: ICD-10-CM

## 2019-12-20 DIAGNOSIS — R79.89 ELEVATED TSH: ICD-10-CM

## 2019-12-20 DIAGNOSIS — B35.4 TINEA CORPORIS: ICD-10-CM

## 2019-12-20 DIAGNOSIS — E78.5 HYPERLIPIDEMIA, UNSPECIFIED HYPERLIPIDEMIA TYPE: ICD-10-CM

## 2019-12-20 PROCEDURE — 90686 IIV4 VACC NO PRSV 0.5 ML IM: CPT | Performed by: INTERNAL MEDICINE

## 2019-12-20 PROCEDURE — G0008 ADMIN INFLUENZA VIRUS VAC: HCPCS | Performed by: INTERNAL MEDICINE

## 2019-12-20 PROCEDURE — 36415 COLL VENOUS BLD VENIPUNCTURE: CPT | Performed by: INTERNAL MEDICINE

## 2019-12-20 PROCEDURE — 80048 BASIC METABOLIC PNL TOTAL CA: CPT | Performed by: INTERNAL MEDICINE

## 2019-12-20 PROCEDURE — 99214 OFFICE O/P EST MOD 30 MIN: CPT | Mod: 25 | Performed by: INTERNAL MEDICINE

## 2019-12-20 PROCEDURE — 84443 ASSAY THYROID STIM HORMONE: CPT | Performed by: INTERNAL MEDICINE

## 2019-12-20 PROCEDURE — 80061 LIPID PANEL: CPT | Performed by: INTERNAL MEDICINE

## 2019-12-20 RX ORDER — AMLODIPINE BESYLATE 5 MG/1
5 TABLET ORAL DAILY
Qty: 90 TABLET | Refills: 1 | Status: SHIPPED | OUTPATIENT
Start: 2019-12-20 | End: 2020-07-10

## 2019-12-20 RX ORDER — RISPERIDONE 3 MG/1
TABLET ORAL
Refills: 0 | COMMUNITY
Start: 2019-02-25 | End: 2024-04-30

## 2019-12-20 RX ORDER — MIRTAZAPINE 15 MG/1
30 TABLET, ORALLY DISINTEGRATING ORAL AT BEDTIME
COMMUNITY

## 2019-12-20 RX ORDER — TOLNAFTATE 1 G/100G
POWDER TOPICAL 2 TIMES DAILY
Qty: 108 G | Refills: 1 | Status: SHIPPED | OUTPATIENT
Start: 2019-12-20 | End: 2024-04-30

## 2019-12-20 RX ORDER — SERTRALINE HYDROCHLORIDE 100 MG/1
TABLET, FILM COATED ORAL
COMMUNITY
Start: 2019-12-10

## 2019-12-20 RX ORDER — KETOCONAZOLE 20 MG/G
CREAM TOPICAL DAILY
Qty: 30 G | Refills: 1 | Status: SHIPPED | OUTPATIENT
Start: 2019-12-20 | End: 2024-04-30

## 2019-12-20 ASSESSMENT — MIFFLIN-ST. JEOR: SCORE: 2273.5

## 2019-12-20 NOTE — PATIENT INSTRUCTIONS
Proceed with Stress Test.  [phone]    Proceed with Sleep Study.  [facility] [phone]      Monitor your blood pressure once a week.  Call doctor if:  -- your blood pressure for the top/upper number is greater than 140 or less than 90  -- your blood pressure for the bottom/lower number is greater than 90 or less than 60    Wash and dry the affected area and then apply the prescribed cream thinly followed by the prescribed powder.  Do this twice a day until the rash resolves and continue for another 2 weeks after.  May apply over the counter hydrocortisone 1% cream to the affected area twice a day as needed for relief of itching (do not use hydrocortisone for more than 14 consecutive days).  Call doctor if your symptoms persist/worsens, or if you develop new symptoms or side effects from the medication/s.    Maintain low fat/calorie diet and regular exercise.    Seek immediate medical attention if you develop persistent/worsening/severe chest pain or shortness of breath, palpitations, fainting/near-fainting spells.    Follow up in May 2020 for your full physical exam (please come in fasting).

## 2019-12-20 NOTE — LETTER
[facility]  [address] [facility]  [address]  [place]  [address]  Tel: [phone]    January 9, 2020    Juanjo Silverio  [address]   [place] [address]        Dear Mr. Silverio,    Your electrolytes (sodium, potassium), blood sugar (glucose), thyroid (TSH), and kidney function (creatinine, GFR) are within normal limits.     Your elevated cholesterol levels can improve with stricter low-fat diet and regular exercise.  Be watchful that your triglycerides do not increase any further as this would increase your risk for a serious condition called pancreatitis.    Dr. Mack Rudd/LILY        Enclosure: Lab Results  Results for orders placed or performed in visit on 12/20/19   Lipid panel reflex to direct LDL Fasting     Status: Abnormal   Result Value Ref Range    Cholesterol 203 (H) <200 mg/dL    Triglycerides 339 (H) <150 mg/dL    HDL Cholesterol 33 (L) >39 mg/dL    LDL Cholesterol Calculated 102 (H) <100 mg/dL    Non HDL Cholesterol 170 (H) <130 mg/dL   Basic metabolic panel     Status: None   Result Value Ref Range    Sodium 138 133 - 144 mmol/L    Potassium 4.0 3.4 - 5.3 mmol/L    Chloride 104 94 - 109 mmol/L    Carbon Dioxide 30 20 - 32 mmol/L    Anion Gap 4 3 - 14 mmol/L    Glucose 87 70 - 99 mg/dL    Urea Nitrogen 14 7 - 30 mg/dL    Creatinine 0.94 0.66 - 1.25 mg/dL    GFR Estimate >90 >60 mL/min/[1.73_m2]    GFR Estimate If Black >90 >60 mL/min/[1.73_m2]    Calcium 9.6 8.5 - 10.1 mg/dL   TSH with free T4 reflex     Status: None   Result Value Ref Range    TSH 3.60 0.40 - 4.00 mU/L

## 2019-12-20 NOTE — PROGRESS NOTES
"Subjective     Juanjo Silverio is a 34 year old male who presents to clinic today for the following health issues:    HPI   Hypertension Follow-up    Do you check your blood pressure regularly outside of the clinic? Yes     Are you following a low salt diet? No    Are your blood pressures ever more than 140 on the top number (systolic) OR more   than 90 on the bottom number (diastolic), for example 140/90? Yes    How many servings of fruits and vegetables do you eat daily?  2-3    On average, how many sweetened beverages do you drink each day (Examples: soda, juice, sweet tea, etc.  Do NOT count diet or artificially sweetened beverages)?   1 either a soda or energy drink  How many days per week do you miss taking your medication? 2    What makes it hard for you to take your medications?  remembering to take      Also concerned of:  --Intermittent chest pain  --Persistent fatigue  --Pruritic rash      Past Medical History:   Diagnosis Date     Depression with anxiety      Mood disorder (H) 6/10/2013     Other acne      Schizophrenia (H)      Suicidal ideation 7/1/2013       Review of Systems   Respiratory: Negative for shortness of breath.    Cardiovascular: Negative for chest pain, palpitations and leg swelling.   Gastrointestinal: Negative for nausea and vomiting.   Neurological: Negative for weakness, light-headedness, numbness and headaches.       /80 (BP Location: Right arm, Patient Position: Sitting, Cuff Size: Adult Large)   Pulse 76   Temp 98.5  F (36.9  C) (Tympanic)   Ht 1.753 m (5' 9\")   Wt 133.3 kg (293 lb 14.4 oz)   SpO2 96%   BMI 43.40 kg/m      Physical Exam  Vitals signs and nursing note reviewed.   Constitutional:       General: He is not in acute distress.  Neck:      Thyroid: No thyromegaly.   Cardiovascular:      Rate and Rhythm: Normal rate and regular rhythm.      Heart sounds: Normal heart sounds.   Pulmonary:      Effort: Pulmonary effort is normal. No respiratory distress.      " Breath sounds: Normal breath sounds.   Skin:     Findings: Rash (Circular mildly erythematous and scaly rash with raised borders) present.   Neurological:      Mental Status: He is alert and oriented to person, place, and time.      Coordination: Coordination normal.           ICD-10-CM    1. Essential hypertension I10 amLODIPine (NORVASC) 5 MG tablet     Basic metabolic panel   2. Chest pain, unspecified type R07.9 Echocardiogram Exercise Stress   3. Fatigue, unspecified type R53.83 TSH with free T4 reflex   4. Hyperlipidemia, unspecified hyperlipidemia type E78.5 Lipid panel reflex to direct LDL Fasting   5. Elevated TSH R79.89 TSH with free T4 reflex   6. Tinea corporis B35.4 ketoconazole (NIZORAL) 2 % external cream     tolnaftate (TINACTIN) 1 % external powder   7. Morbid obesity (H) E66.01    8. Need for prophylactic vaccination and inoculation against influenza Z23 INFLUENZA VACCINE IM > 6 MONTHS VALENT IIV4 [95627]     Vaccine Administration, Initial [66258]     ADMIN INFLUENZA (For MEDICARE Patients ONLY) []       Patient Instructions   Proceed with Stress Test.  [phone]    Proceed with Sleep Study.  [facility] [phone]      Monitor your blood pressure once a week.  Call doctor if:  -- your blood pressure for the top/upper number is greater than 140 or less than 90  -- your blood pressure for the bottom/lower number is greater than 90 or less than 60    Wash and dry the affected area and then apply the prescribed cream thinly followed by the prescribed powder.  Do this twice a day until the rash resolves and continue for another 2 weeks after.  May apply over the counter hydrocortisone 1% cream to the affected area twice a day as needed for relief of itching (do not use hydrocortisone for more than 14 consecutive days).  Call doctor if your symptoms persist/worsens, or if you develop new symptoms or side effects from the medication/s.    Maintain low fat/calorie diet  and regular exercise.    Seek immediate medical attention if you develop persistent/worsening/severe chest pain or shortness of breath, palpitations, fainting/near-fainting spells.    Follow up in May 2020 for your full physical exam (please come in fasting).

## 2019-12-21 LAB
ANION GAP SERPL CALCULATED.3IONS-SCNC: 4 MMOL/L (ref 3–14)
BUN SERPL-MCNC: 14 MG/DL (ref 7–30)
CALCIUM SERPL-MCNC: 9.6 MG/DL (ref 8.5–10.1)
CHLORIDE SERPL-SCNC: 104 MMOL/L (ref 94–109)
CHOLEST SERPL-MCNC: 203 MG/DL
CO2 SERPL-SCNC: 30 MMOL/L (ref 20–32)
CREAT SERPL-MCNC: 0.94 MG/DL (ref 0.66–1.25)
GFR SERPL CREATININE-BSD FRML MDRD: >90 ML/MIN/{1.73_M2}
GLUCOSE SERPL-MCNC: 87 MG/DL (ref 70–99)
HDLC SERPL-MCNC: 33 MG/DL
LDLC SERPL CALC-MCNC: 102 MG/DL
NONHDLC SERPL-MCNC: 170 MG/DL
POTASSIUM SERPL-SCNC: 4 MMOL/L (ref 3.4–5.3)
SODIUM SERPL-SCNC: 138 MMOL/L (ref 133–144)
TRIGL SERPL-MCNC: 339 MG/DL
TSH SERPL DL<=0.005 MIU/L-ACNC: 3.6 MU/L (ref 0.4–4)

## 2020-01-02 ASSESSMENT — ENCOUNTER SYMPTOMS
WEAKNESS: 0
LIGHT-HEADEDNESS: 0
VOMITING: 0
NAUSEA: 0
HEADACHES: 0
SHORTNESS OF BREATH: 0
PALPITATIONS: 0
NUMBNESS: 0

## 2020-01-04 ENCOUNTER — HOSPITAL ENCOUNTER (EMERGENCY)
Facility: CLINIC | Age: 35
Discharge: HOME | End: 2020-01-04
Attending: EMERGENCY MEDICINE | Admitting: EMERGENCY MEDICINE
Payer: MEDICARE

## 2020-01-04 VITALS
DIASTOLIC BLOOD PRESSURE: 84 MMHG | SYSTOLIC BLOOD PRESSURE: 130 MMHG | OXYGEN SATURATION: 99 % | HEART RATE: 102 BPM | TEMPERATURE: 98.4 F | HEIGHT: 69 IN | RESPIRATION RATE: 18 BRPM | BODY MASS INDEX: 42.95 KG/M2 | WEIGHT: 290 LBS

## 2020-01-04 DIAGNOSIS — R45.851 SUICIDAL THOUGHTS: ICD-10-CM

## 2020-01-04 LAB — ALCOHOL BREATH TEST: 0.01 (ref 0–0.01)

## 2020-01-04 PROCEDURE — 99285 EMERGENCY DEPT VISIT HI MDM: CPT | Mod: 25

## 2020-01-04 PROCEDURE — 90791 PSYCH DIAGNOSTIC EVALUATION: CPT

## 2020-01-04 PROCEDURE — 82075 ASSAY OF BREATH ETHANOL: CPT

## 2020-01-04 ASSESSMENT — ENCOUNTER SYMPTOMS
DYSPHORIC MOOD: 1
HALLUCINATIONS: 0

## 2020-01-04 ASSESSMENT — MIFFLIN-ST. JEOR: SCORE: 2255.81

## 2020-01-04 NOTE — ED AVS SNAPSHOT
Emergency Department  [address] [place] [address]  Phone:  [phone]  Fax:  [phone]                                    Juanjo Silverio   MRN: [id number]    Department:   Emergency Department   Date of Visit:  1/4/2020           After Visit Summary Signature Page    I have received my discharge instructions, and my questions have been answered. I have discussed any challenges I see with this plan with the nurse or doctor.    ..........................................................................................................................................  Patient/Patient Representative Signature      ..........................................................................................................................................  Patient Representative Print Name and Relationship to Patient    ..................................................               ................................................  Date                                   Time    ..........................................................................................................................................  Reviewed by Signature/Title    ...................................................              ..............................................  Date                                               Time          22EPIC Rev 08/18

## 2020-01-04 NOTE — DISCHARGE INSTRUCTIONS
You have declined any resources we offered here.  If you change your mind or if you are having worsening thoughts of harming yourself please return to the emergency department for treatment.  Adhere to safety contract including calling 911 or return if you are having thoughts of hurting yourself.  Please follow-up with your regular doctor after this ER visit and discuss the depression and any other concerns.  Avoid alcohol.

## 2020-01-04 NOTE — ED TRIAGE NOTES
Patient states having suicidal thoughts.  They started a long time ago.  States his brother called the police.  He came in voluntarily with .  States he was going to kill himself.

## 2020-01-04 NOTE — ED PROVIDER NOTES
History     Chief Complaint:  Suicidal    The history is provided by the patient.      Juanjo Silverio is a 34 year old male with a history of mental illness as below who presents for evaluation of suicidal ideation. He developed suicidal thoughts today without a plan but cannot cite any specific triggering factors. He sent a text message to his brother regarding these suicidal thoughts which prompted police to be called. Juanjo feels it is important to tell me he came with police voluntarily. He drank 3 alcoholic beverages this evening.    Upon arrival he denies persistent suicidal thoughts. He denies homicidal thoughts, hallucinations, or drug use. He would like to be discharged so he can get to work in the morning. He reports compliance with the medications he takes for depression but he no longer sees a therapist as it was not helpful and he did not like the one that he was following with. He would be interested in finding a new therapist. He has required mental health admission in the past - last 5 years ago.     Allergies:  NKDA     Medications:    Amlodipine  Lipitor  Atarax  Mirtazapine  Risperdal  Zoloft  Trazodone     Past Medical History:    Depression with anxiety  Mood disorder  Schizophrenia  Suicidal ideation   Hypertension    Past Surgical History:    Appendectomy   Circumcision    Family History:    Father: CAD, anxiety, hypertension  Mother: multiple sclerosis, hypothyroidism, CAD, myocardial infarction     Social History:  The patient was unaccompanied to the ED.  Patient lives alone  Smoking Status: Former Smoker, 0.5 packs/day  Smokeless Tobacco: Never Used  Alcohol Use: Positive, occasionally  Drug Use: Negative   Marital Status:  Single      Review of Systems   Psychiatric/Behavioral: Positive for dysphoric mood and suicidal ideas (now resolved). Negative for hallucinations.        Denies homicidal thoughts   All other systems reviewed and are negative.    Physical Exam     Patient Vitals for  "the past 24 hrs:   BP Temp Temp src Pulse Heart Rate Resp SpO2 Height Weight   01/04/20 0424 -- -- -- -- -- -- 99 % -- --   01/04/20 0423 130/84 -- -- 102 -- -- -- -- --   01/04/20 0217 (!) 197/107 98.4  F (36.9  C) Oral 113 113 18 96 % 1.753 m (5' 9\") 131.5 kg (290 lb)        Physical Exam  General: WD/WN; well appearing young  man; cooperative  Head:  Atraumatic  Eyes:  Conjunctivae, lids, and sclerae are normal  ENT:    Normal nose; MMM  Neck:  Supple; normal ROM  Resp:  No respiratory distress  GI:  Nondistended    MS:  Normal ROM  Skin:  Warm; non-diaphoretic; no pallor  Neuro: Awake; A&Ox3  Psych:  Flat mood and affect; normal speech; not responding to internal stimuli; denies suicidal thought content  Vitals reviewed.    Emergency Department Course     Laboratory:  Alcohol breath test POCT: 0.01    Emergency Department Course:   Past medical records, nursing notes, and vitals reviewed.  0230: I performed an exam of the patient and obtained history, as documented above.    An alcohol breath test was performed.     DEC met with the patient.     0414 I spoke with DEC after her evaluation of the patient.     0437 I rechecked and updated the patient. He continues to decline any resources.     Findings and plan explained to the patient. Patient discharged home with instructions regarding supportive care, medications, and reasons to return. The importance of close follow-up was reviewed.     Impression & Plan    Medical Decision Making:  Juanjo is a 34-year-old man who texted his brother he was feeling suicidal. His brother called police and ultimately this prompted his presentation.  Juanjo states his suicidal thoughts have resolved.  He does struggle with depression for which he takes medications but he has not found therapy to be helpful and is not currently participating in this.  He denies any inciting factor for his thoughts of wanting to harm himself but does seem to be somewhat guarded or " withholding during interview and refused for DEC to get collateral information from his brother.  He was drinking though his breathalyzer is negligible at 0.01.  As such he was seen by DEC who offered multiple services.  Juanjo declined all of the services and continues to deny any suicidal ideation.  He denies homicidal ideation and hallucinations and, at this point, there is no reason to place this man on hold as he is continuing to deny risk of harm to himself or others and is not acutely psychotic.  I discussed with him the importance of returning if he changes his mind regarding any resources we have to offer and calling his psychiatrist to see if medications may need to be changed.  He agrees to a safety contract and will return if he is having thoughts of harming himself.  I recommended he avoid alcohol.  All the patient's questions were answered and he verbalized understanding.  He is requesting discharge as he would like to get to work in a few hours.    Diagnosis:    ICD-10-CM    1. Suicidal thoughts R45.851      Disposition:  discharged home    I, Jaquelin Jeffers, am serving as a scribe on 1/4/2020 at 2:59 AM to personally document services performed by Rebeca Montanez MD based on my observations and the provider's statements to me.      Jaquelin Jeffers  1/4/2020    EMERGENCY DEPARTMENT       Rebeca Montanez MD  01/07/20 0949

## 2020-01-22 DIAGNOSIS — E78.5 HYPERLIPIDEMIA, UNSPECIFIED HYPERLIPIDEMIA TYPE: Primary | ICD-10-CM

## 2020-01-22 NOTE — TELEPHONE ENCOUNTER
Medication is historical in chart    Recent Labs   Lab Test 12/20/19  1200 05/29/19  1042 11/04/14  0737   CHOL 203* 115 225*   HDL 33* 31* 39*   * 58 165*   TRIG 339* 132 104   CHOLHDLRATIO  --   --  5.8*     Pending Prescriptions:                       Disp   Refills    atorvastatin (LIPITOR) 40 MG tablet       90 tab*1            Sig: Take 1 tablet (40 mg) by mouth daily - Fasting           office visit due May 2020 with Dr Giron          Last Written Prescription Date:  historical  Last Fill Quantity: -,   # refills: -  Last Office Visit: 12-20-19 Mack  Future Office visit:       Routing refill request to provider for review/approval because:  Medication is reported/historical    Glenny Bacon RT (R)

## 2020-01-24 RX ORDER — ATORVASTATIN CALCIUM 40 MG/1
40 TABLET, FILM COATED ORAL DAILY
Qty: 90 TABLET | Refills: 0 | Status: SHIPPED | OUTPATIENT
Start: 2020-01-24 | End: 2020-04-07

## 2020-04-06 DIAGNOSIS — E78.5 HYPERLIPIDEMIA, UNSPECIFIED HYPERLIPIDEMIA TYPE: ICD-10-CM

## 2020-04-06 NOTE — TELEPHONE ENCOUNTER
"atorvastatin (LIPITOR) 40 MG tablet  90 tablet  0  1/24/2020   No    Sig - Route: Take 1 tablet (40 mg) by mouth daily      Last Written Prescription Date:  01/24/2020  Last Fill Quantity: 90,  # refills: 0   Last office visit: 12/20/2019 with prescribing provider:     Future Office Visit:        Requested Prescriptions   Pending Prescriptions Disp Refills     atorvastatin (LIPITOR) 40 MG tablet 90 tablet 0     Sig: Take 1 tablet (40 mg) by mouth daily - Fasting office visit due May 2020 with Dr Giron       Statins Protocol Passed - 4/6/2020 10:47 AM        Passed - LDL on file in past 12 months     Recent Labs   Lab Test 12/20/19  1200   *             Passed - No abnormal creatine kinase in past 12 months     No lab results found.             Passed - Recent (12 mo) or future (30 days) visit within the authorizing provider's specialty     Patient has had an office visit with the authorizing provider or a provider within the authorizing providers department within the previous 12 mos or has a future within next 30 days. See \"Patient Info\" tab in inbasket, or \"Choose Columns\" in Meds & Orders section of the refill encounter.              Passed - Medication is active on med list        Passed - Patient is age 18 or older             "

## 2020-04-07 RX ORDER — ATORVASTATIN CALCIUM 40 MG/1
40 TABLET, FILM COATED ORAL DAILY
Qty: 90 TABLET | Refills: 0 | Status: SHIPPED | OUTPATIENT
Start: 2020-04-07 | End: 2020-07-10

## 2020-05-14 DIAGNOSIS — I10 ESSENTIAL HYPERTENSION: ICD-10-CM

## 2020-05-14 NOTE — TELEPHONE ENCOUNTER
Refill request:    AMLODIPINE BESYLATE 5 MG TABLET  QTY 90.    Summary: Take 1 tablet (5 mg) by mouth daily Please schedule follow-up with Dr. Giron for May 2020, Disp-90 tablet, R-1, E-Prescribe   Dose, Route, Frequency: 5 mg, Oral, DAILY  Start: 12/20/2019  Ord/Sold: 12/20/2019 (O

## 2020-05-15 RX ORDER — AMLODIPINE BESYLATE 5 MG/1
5 TABLET ORAL DAILY
Start: 2020-05-15

## 2020-07-09 DIAGNOSIS — I10 ESSENTIAL HYPERTENSION: ICD-10-CM

## 2020-07-09 DIAGNOSIS — E78.5 HYPERLIPIDEMIA, UNSPECIFIED HYPERLIPIDEMIA TYPE: ICD-10-CM

## 2020-07-10 RX ORDER — ATORVASTATIN CALCIUM 40 MG/1
40 TABLET, FILM COATED ORAL DAILY
Qty: 30 TABLET | Refills: 0 | Status: SHIPPED | OUTPATIENT
Start: 2020-07-10 | End: 2020-08-04

## 2020-07-10 RX ORDER — AMLODIPINE BESYLATE 5 MG/1
5 TABLET ORAL DAILY
Qty: 30 TABLET | Refills: 0 | Status: SHIPPED | OUTPATIENT
Start: 2020-07-10 | End: 2020-08-04

## 2020-08-03 DIAGNOSIS — E78.5 HYPERLIPIDEMIA, UNSPECIFIED HYPERLIPIDEMIA TYPE: ICD-10-CM

## 2020-08-03 DIAGNOSIS — I10 ESSENTIAL HYPERTENSION: ICD-10-CM

## 2020-08-03 NOTE — TELEPHONE ENCOUNTER
Patient due for fasting physical  Has seen most recently Dr Giron (twice) and Dr Sheehan (once)  - all in 2019    TC - please contact patient to schedule ASAP.   May need letter sent.    RT Dagoberto (R)

## 2020-08-04 RX ORDER — AMLODIPINE BESYLATE 5 MG/1
5 TABLET ORAL DAILY
Qty: 30 TABLET | Refills: 1 | Status: SHIPPED | OUTPATIENT
Start: 2020-08-04 | End: 2020-09-25

## 2020-08-04 RX ORDER — ATORVASTATIN CALCIUM 40 MG/1
40 TABLET, FILM COATED ORAL DAILY
Qty: 30 TABLET | Refills: 1 | Status: SHIPPED | OUTPATIENT
Start: 2020-08-04 | End: 2020-09-25

## 2020-09-24 DIAGNOSIS — E78.5 HYPERLIPIDEMIA, UNSPECIFIED HYPERLIPIDEMIA TYPE: ICD-10-CM

## 2020-09-24 DIAGNOSIS — I10 ESSENTIAL HYPERTENSION: ICD-10-CM

## 2020-09-25 NOTE — TELEPHONE ENCOUNTER
Routing refill request to provider for review/approval because:  Drug interaction warning      Please review and authorize if appropriate,     Thank you,   Reny OLIVEROS RN

## 2020-10-01 RX ORDER — ATORVASTATIN CALCIUM 40 MG/1
40 TABLET, FILM COATED ORAL DAILY
Qty: 90 TABLET | Refills: 0 | Status: SHIPPED | OUTPATIENT
Start: 2020-10-01 | End: 2020-11-24

## 2020-10-01 RX ORDER — AMLODIPINE BESYLATE 5 MG/1
5 TABLET ORAL DAILY
Qty: 90 TABLET | Refills: 0 | Status: SHIPPED | OUTPATIENT
Start: 2020-10-01 | End: 2020-11-24

## 2020-10-01 NOTE — TELEPHONE ENCOUNTER
Routing refill request to provider for review/approval because:  Former Giron pt and overdue for apt.  Added in pharm comments to schedule apt.  Please authorize if appropriate.  Thanks,  Gardenia Donovan RN

## 2021-02-28 DIAGNOSIS — E78.5 HYPERLIPIDEMIA, UNSPECIFIED HYPERLIPIDEMIA TYPE: ICD-10-CM

## 2021-02-28 DIAGNOSIS — I10 ESSENTIAL HYPERTENSION: ICD-10-CM

## 2021-03-12 RX ORDER — AMLODIPINE BESYLATE 5 MG/1
TABLET ORAL
Qty: 30 TABLET | Refills: 0 | Status: SHIPPED | OUTPATIENT
Start: 2021-03-12 | End: 2021-05-14

## 2021-03-12 RX ORDER — ATORVASTATIN CALCIUM 40 MG/1
TABLET, FILM COATED ORAL
Qty: 30 TABLET | Refills: 0 | Status: SHIPPED | OUTPATIENT
Start: 2021-03-12 | End: 2021-05-14

## 2021-05-14 ENCOUNTER — OFFICE VISIT (OUTPATIENT)
Dept: FAMILY MEDICINE | Facility: CLINIC | Age: 36
End: 2021-05-14
Payer: MEDICARE

## 2021-05-14 ENCOUNTER — TELEPHONE (OUTPATIENT)
Dept: FAMILY MEDICINE | Facility: CLINIC | Age: 36
End: 2021-05-14

## 2021-05-14 VITALS
TEMPERATURE: 96.2 F | HEIGHT: 69 IN | HEART RATE: 79 BPM | SYSTOLIC BLOOD PRESSURE: 133 MMHG | OXYGEN SATURATION: 98 % | BODY MASS INDEX: 46.65 KG/M2 | DIASTOLIC BLOOD PRESSURE: 80 MMHG | WEIGHT: 315 LBS

## 2021-05-14 DIAGNOSIS — T88.7XXA DRUG SIDE EFFECTS: ICD-10-CM

## 2021-05-14 DIAGNOSIS — Z00.00 ROUTINE HISTORY AND PHYSICAL EXAMINATION OF ADULT: Primary | ICD-10-CM

## 2021-05-14 DIAGNOSIS — E78.5 HYPERLIPIDEMIA, UNSPECIFIED HYPERLIPIDEMIA TYPE: ICD-10-CM

## 2021-05-14 DIAGNOSIS — F41.8 DEPRESSION WITH ANXIETY: ICD-10-CM

## 2021-05-14 DIAGNOSIS — Z13.1 SCREENING FOR DIABETES MELLITUS: ICD-10-CM

## 2021-05-14 DIAGNOSIS — H60.502 ACUTE OTITIS EXTERNA OF LEFT EAR, UNSPECIFIED TYPE: ICD-10-CM

## 2021-05-14 DIAGNOSIS — I10 ESSENTIAL HYPERTENSION: ICD-10-CM

## 2021-05-14 DIAGNOSIS — E78.5 HYPERLIPIDEMIA LDL GOAL <100: ICD-10-CM

## 2021-05-14 LAB
ALBUMIN SERPL-MCNC: 4 G/DL (ref 3.4–5)
ALP SERPL-CCNC: 110 U/L (ref 40–150)
ALT SERPL W P-5'-P-CCNC: 41 U/L (ref 0–70)
ANION GAP SERPL CALCULATED.3IONS-SCNC: 7 MMOL/L (ref 3–14)
AST SERPL W P-5'-P-CCNC: 14 U/L (ref 0–45)
BILIRUB SERPL-MCNC: 0.4 MG/DL (ref 0.2–1.3)
BUN SERPL-MCNC: 13 MG/DL (ref 7–30)
CALCIUM SERPL-MCNC: 8.9 MG/DL (ref 8.5–10.1)
CHLORIDE SERPL-SCNC: 105 MMOL/L (ref 94–109)
CHOLEST SERPL-MCNC: 185 MG/DL
CO2 SERPL-SCNC: 27 MMOL/L (ref 20–32)
CREAT SERPL-MCNC: 0.85 MG/DL (ref 0.66–1.25)
ERYTHROCYTE [DISTWIDTH] IN BLOOD BY AUTOMATED COUNT: 13.7 % (ref 10–15)
GFR SERPL CREATININE-BSD FRML MDRD: >90 ML/MIN/{1.73_M2}
GLUCOSE SERPL-MCNC: 98 MG/DL (ref 70–99)
HCT VFR BLD AUTO: 44.7 % (ref 40–53)
HDLC SERPL-MCNC: 40 MG/DL
HGB BLD-MCNC: 15.3 G/DL (ref 13.3–17.7)
LDLC SERPL CALC-MCNC: 101 MG/DL
MCH RBC QN AUTO: 30.4 PG (ref 26.5–33)
MCHC RBC AUTO-ENTMCNC: 34.2 G/DL (ref 31.5–36.5)
MCV RBC AUTO: 89 FL (ref 78–100)
NONHDLC SERPL-MCNC: 145 MG/DL
PLATELET # BLD AUTO: 233 10E9/L (ref 150–450)
POTASSIUM SERPL-SCNC: 4 MMOL/L (ref 3.4–5.3)
PROT SERPL-MCNC: 8 G/DL (ref 6.8–8.8)
RBC # BLD AUTO: 5.03 10E12/L (ref 4.4–5.9)
SODIUM SERPL-SCNC: 139 MMOL/L (ref 133–144)
TRIGL SERPL-MCNC: 221 MG/DL
WBC # BLD AUTO: 9.3 10E9/L (ref 4–11)

## 2021-05-14 PROCEDURE — 80061 LIPID PANEL: CPT | Performed by: INTERNAL MEDICINE

## 2021-05-14 PROCEDURE — 36415 COLL VENOUS BLD VENIPUNCTURE: CPT | Performed by: INTERNAL MEDICINE

## 2021-05-14 PROCEDURE — 85027 COMPLETE CBC AUTOMATED: CPT | Performed by: INTERNAL MEDICINE

## 2021-05-14 PROCEDURE — 80053 COMPREHEN METABOLIC PANEL: CPT | Performed by: INTERNAL MEDICINE

## 2021-05-14 PROCEDURE — G0439 PPPS, SUBSEQ VISIT: HCPCS | Performed by: INTERNAL MEDICINE

## 2021-05-14 PROCEDURE — 93000 ELECTROCARDIOGRAM COMPLETE: CPT | Performed by: INTERNAL MEDICINE

## 2021-05-14 PROCEDURE — 82043 UR ALBUMIN QUANTITATIVE: CPT | Performed by: INTERNAL MEDICINE

## 2021-05-14 PROCEDURE — 99214 OFFICE O/P EST MOD 30 MIN: CPT | Mod: 25 | Performed by: INTERNAL MEDICINE

## 2021-05-14 RX ORDER — AMLODIPINE BESYLATE 5 MG/1
5 TABLET ORAL DAILY
Qty: 90 TABLET | Refills: 1 | Status: SHIPPED | OUTPATIENT
Start: 2021-05-14 | End: 2021-11-08

## 2021-05-14 RX ORDER — ATORVASTATIN CALCIUM 40 MG/1
40 TABLET, FILM COATED ORAL DAILY
Qty: 90 TABLET | Refills: 1 | Status: SHIPPED | OUTPATIENT
Start: 2021-05-14 | End: 2021-11-08

## 2021-05-14 RX ORDER — CIPROFLOXACIN AND DEXAMETHASONE 3; 1 MG/ML; MG/ML
4 SUSPENSION/ DROPS AURICULAR (OTIC) 2 TIMES DAILY
Qty: 7.5 ML | Refills: 0 | Status: SHIPPED | OUTPATIENT
Start: 2021-05-14 | End: 2024-04-30

## 2021-05-14 ASSESSMENT — ACTIVITIES OF DAILY LIVING (ADL): CURRENT_FUNCTION: NO ASSISTANCE NEEDED

## 2021-05-14 ASSESSMENT — MIFFLIN-ST. JEOR: SCORE: 2405.03

## 2021-05-14 NOTE — LETTER
May 17, 2021      Juanjo Watsoner  [address]   [place] [address]        Dear ,    We are writing to inform you of your test results.  Juanjo, reviewed your labs     EKG  shows normal sinus rhythm, QT is not prolonged which is reassuring     Lipid panel shows numbers including LDL at goal, HDL at goal, slightly elevated triglycerides at 221 but decreased from 339, 1 year ago.   Please continue to endorse lifestyle changes, increase exercise activities; brisk walking 30 minutes 5 times a week and low-fat low-cholesterol diet and weight loss as tolerated.     Chemistry shows normal electrolytes, normal kidney function test, normal calcium level, normal total protein albumin, and normal liver enzymes called ALT and AST.     CBC shows normal white cell count, normal hemoglobin hematocrit there is no anemia, and normal platelet count.   Urine microalbumin is negative; means there is no protein in the urine and is reassuring.     Any further questions please let me know   Dr. Davidson

## 2021-05-14 NOTE — TELEPHONE ENCOUNTER
[facility] pharmacist called reporting (CIPRODEX) 0.3-0.1 % otic suspension is not covered by patients insurance. They will cover brand name, but copay is $262.    Please advice if ok to proceed with prior authorization.   Pharmacy is [facility] [phone]  ID:[phone].

## 2021-05-14 NOTE — PROGRESS NOTES
"SUBJECTIVE:   Juanjo Silverio is a 35 year old male who presents for Preventive Visit.      Patient has been advised of split billing requirements and indicates understanding: Yes   Are you in the first 12 months of your Medicare coverage?  No    Healthy Habits:    In general, how would you rate your overall health?  Fair    Frequency of exercise:  2-3 days/week    Duration of exercise:  30-45 minutes    Do you usually eat at least 4 servings of fruit and vegetables a day, include whole grains    & fiber and avoid regularly eating high fat or \"junk\" foods?  No (maybe 1-2)    Taking medications regularly:  Yes    Barriers to taking medications:  None    Medication side effects:  None    Ability to successfully perform activities of daily living:  No assistance needed    Home Safety:  No safety concerns identified    Hearing Impairment:  No hearing concerns    In the past 6 months, have you been bothered by leaking of urine?  No    In general, how would you rate your overall mental or emotional health?  Good      PHQ-2 Total Score:    Additional concerns today:  No    Do you feel safe in your environment? Yes    Have you ever done Advance Care Planning? (For example, a Health Directive, POLST, or a discussion with a medical provider or your loved ones about your wishes): No, advance care planning information given to patient to review.  Patient plans to discuss their wishes with loved ones or provider.         Fall risk  Fallen 2 or more times in the past year?: No    Cognitive Screening   1) Repeat 3 items (Leader, Season, Table)    2) Clock draw: NORMAL  3) 3 item recall: Recalls 2 objects   Results: NORMAL clock, 1-2 items recalled: COGNITIVE IMPAIRMENT LESS LIKELY    Mini-CogTM Copyright NAVDEEP Vasquez. Licensed by the author for use in [facility]; reprinted with permission (ryan@.[facility]). All rights reserved.      Do you have sleep apnea, excessive snoring or daytime drowsiness?: yes  Some snoring  Reviewed " "and updated as needed this visit by clinical staff  Tobacco  Allergies  Meds   Med Hx  Surg Hx  Fam Hx          Reviewed and updated as needed this visit by Provider  Tobacco  Allergies  Meds   Med Hx  Surg Hx  Fam Hx         Social History     Tobacco Use     Smoking status: Former Smoker     Packs/day: 0.50     Quit date: 2008     Years since quittin.3     Smokeless tobacco: Never Used     Tobacco comment: occasional   Substance Use Topics     Alcohol use: Yes     Comment: very occasionally     If you drink alcohol do you typically have >3 drinks per day or >7 drinks per week? No    Alcohol Use 2021   Prescreen: >3 drinks/day or >7 drinks/week? No         respirdone 3 mg pm, dr kamara at [facility]  mirtazapine . Melatonin , trazodone as needed , muscle \"ticks\"    On amlodipine and atorvastatin, /80    EXERCISE-- every once a while , walks slow couple miles.     Sleep apnea--snore at night, no nocturia    Feels tired during,       Vision and hearing good      Current providers sharing in care for this patient include:   Patient Care Team:  Kiran Giron MD as PCP - General (Internal Medicine)  Kiran Giron MD as Assigned PCP    The following health maintenance items are reviewed in Epic and correct as of today:  Health Maintenance Due   Topic Date Due     ANNUAL REVIEW OF  ORDERS  Never done     HEPATITIS C SCREENING  Never done     PHQ-2  2021     COVID-19 Vaccine (2 - Pfizer 2-dose series) 2021     Lab work is in process  Labs reviewed in EPIC  BP Readings from Last 3 Encounters:   21 133/80   20 130/84   19 120/80    Wt Readings from Last 3 Encounters:   21 147 kg (324 lb)   20 131.5 kg (290 lb)   19 133.3 kg (293 lb 14.4 oz)                  Patient Active Problem List   Diagnosis     Other acne     CARDIOVASCULAR SCREENING; LDL GOAL LESS THAN 160     Depression with anxiety     Morbid " obesity (H)     Past Surgical History:   Procedure Laterality Date     C APPENDECTOMY  11-    viral, not supperative at surgery     CIRCUMCISION      Adult 2013       Social History     Tobacco Use     Smoking status: Former Smoker     Packs/day: 0.50     Quit date: 2008     Years since quittin.3     Smokeless tobacco: Never Used     Tobacco comment: occasional   Substance Use Topics     Alcohol use: Yes     Comment: very occasionally     Family History   Problem Relation Age of Onset     C.A.D. Maternal Grandfather         MI @ 43     Coronary Artery Disease Maternal Grandfather      Anxiety Disorder Father      Hypertension Father      Neurologic Disorder Mother         MS     Thyroid Disease Mother         hypothyroid     Coronary Artery Disease Mother         had heart attack and passed away from it     Schizophrenia Paternal Uncle      Bipolar Disorder Paternal Uncle      Substance Abuse Maternal Uncle      Diabetes No family hx of      Cerebrovascular Disease No family hx of      Colon Cancer No family hx of      Prostate Cancer No family hx of          Current Outpatient Medications   Medication Sig Dispense Refill     amLODIPine (NORVASC) 5 MG tablet Take 1 tablet (5 mg) by mouth daily 90 tablet 1     atorvastatin (LIPITOR) 40 MG tablet Take 1 tablet (40 mg) by mouth daily 90 tablet 1     ciprofloxacin-dexamethasone (CIPRODEX) 0.3-0.1 % otic suspension Place 4 drops Into the left ear 2 times daily 7.5 mL 0     hydrOXYzine (ATARAX) 50 MG tablet Take 1 tablet (50 mg) by mouth 3 times daily as needed for anxiety 90 tablet 0     ketoconazole (NIZORAL) 2 % external cream Apply topically daily 30 g 1     melatonin 3 MG tablet Take 1 tablet (3 mg) by mouth At Bedtime 30 tablet 0     mirtazapine (REMERON SOL-TAB) 15 MG ODT 15 mg by Orally disintegrating tablet route At Bedtime       risperiDONE (RISPERDAL) 3 MG tablet   0     sertraline (ZOLOFT) 100 MG tablet TAKE TWO TABLETS BY MOUTH DAILY.   "PLEASE BE SEEN IN CLINIC FOR NEXT REFILL.       tolnaftate (TINACTIN) 1 % external powder Apply topically 2 times daily 108 g 1     traZODone (DESYREL) 50 MG tablet Take 1-2 tablets ( mg) by mouth nightly as needed for sleep 60 tablet 0     Allergies   Allergen Reactions     No Known Drug Allergies      Recent Labs   Lab Test 12/20/19  1200 05/29/19  1042 11/04/14  0737 05/11/13  0846   * 58 165*  --    HDL 33* 31* 39*  --    TRIG 339* 132 104  --    ALT  --  36 23 46   CR 0.94 1.14 1.00 0.90   GFRESTIMATED >90 85 90 >90   GFRESTBLACK >90 >90 >90   GFR Calc   >90   POTASSIUM 4.0 3.9 4.0 3.8   TSH 3.60 4.86* 2.68 1.05            Review of Systems  Constitutional, HEENT, cardiovascular, pulmonary, GI, , musculoskeletal, neuro, skin, endocrine and psych systems are negative, except as otherwise noted.    Patient has history of depression schizophrenia?  Schizoaffective disorder.  He follows with psychiatry on a monthly basis on [facility] he is on risperidone and Zoloft.  And as needed trazodone as well as mirtazapine.  He states his mood is stable.  There is a paternal uncle with similar mental health issues or disease.  Patient complaining of some left ear pain over the last couple days.  He is maintained on amlodipine and statins for long time.  Well-tolerated.  Describes some occasional snoring is not sure about apneas denies any somnolence but he does feel tired during the day.    OBJECTIVE:   /80 (BP Location: Right arm, Patient Position: Chair, Cuff Size: Adult Large)   Pulse 79   Temp 96.2  F (35.7  C) (Temporal)   Ht 1.753 m (5' 9\")   Wt 147 kg (324 lb)   SpO2 98%   BMI 47.85 kg/m   Estimated body mass index is 47.85 kg/m  as calculated from the following:    Height as of this encounter: 1.753 m (5' 9\").    Weight as of this encounter: 147 kg (324 lb).  Physical Exam  GENERAL: healthy, alert and no distress  EYES: Eyes grossly normal to inspection, PERRL and " conjunctivae and sclerae normal  HENT: ear canals and TM's normal, left ear canal slightly narrow and tender on inserting the ear speculum with minimal redness.  Nose and mouth without ulcers or lesions  NECK: no adenopathy, no asymmetry, masses, or scars and thyroid normal to palpation  RESP: lungs clear to auscultation - no rales, rhonchi or wheezes  CV: regular rate and rhythm, normal S1 S2, no S3 or S4, no murmur, click or rub, no peripheral edema and peripheral pulses strong  ABDOMEN: soft, nontender, no hepatosplenomegaly, no masses and bowel sounds normal   (male): normal male genitalia without lesions or urethral discharge, no hernia  MS: no gross musculoskeletal defects noted, no edema  SKIN: no suspicious lesions or rashes  NEURO: Normal strength and tone, mentation intact and speech normal  PSYCH: mentation appears normal, affect normal/bright  LYMPH: no cervical, supraclavicular, axillary, or inguinal adenopathy    Diagnostic Test Results:  Labs reviewed in Epic    ASSESSMENT / PLAN:   Juanjo was seen today for establish care and physical.    Diagnoses and all orders for this visit:    Routine history and physical examination of adult    Hyperlipidemia LDL goal <100  -     Lipid panel reflex to direct LDL Fasting    Depression with anxiety    Essential hypertension  -     Comprehensive metabolic panel (BMP + Alb, Alk Phos, ALT, AST, Total. Bili, TP)  -     CBC with platelets  -     Albumin Random Urine Quantitative with Creat Ratio  -     amLODIPine (NORVASC) 5 MG tablet; Take 1 tablet (5 mg) by mouth daily    Screening for diabetes mellitus    Drug side effects  -     EKG 12-lead complete w/read - Clinics    Acute otitis externa of left ear, unspecified type  -     ciprofloxacin-dexamethasone (CIPRODEX) 0.3-0.1 % otic suspension; Place 4 drops Into the left ear 2 times daily    Hyperlipidemia, unspecified hyperlipidemia type  -     atorvastatin (LIPITOR) 40 MG tablet; Take 1 tablet (40 mg) by mouth  "daily      Advised patient to call us with blood pressure readings.  We will need probably work-up for obstructive sleep apnea he has a setting for that hypertensive and obesity does have some snoring denies any nocturia.  Denies any somnolence during the day.  Continue with lifestyle changes exercise activities brisk walking 30 minutes 5 times a week and weight loss as tolerated.  Patient advised continue follow-up with psychiatry will do an EKG rule out QT prolongation is on multiple medications that can cause QT prolongation.  Refill given for amlodipine lipid panel we will check basic labs today including urine microalbumin.  Possibility of otitis externa we will give him Ciprodex for for the next 3 days if any worsening pain follow-up.  Patient is up-to-date on his Covid vaccines.  Follow-up in 3 months and as needed.    Patient has been advised of split billing requirements and indicates understanding: Yes  COUNSELING:  Reviewed preventive health counseling, as reflected in patient instructions       Regular exercise       Healthy diet/nutrition       Vision screening       Hearing screening       Dental care       Bladder control       Safe sex practices/STD prevention    Estimated body mass index is 47.85 kg/m  as calculated from the following:    Height as of this encounter: 1.753 m (5' 9\").    Weight as of this encounter: 147 kg (324 lb).    Weight management plan: Discussed healthy diet and exercise guidelines    He reports that he quit smoking about 13 years ago. He smoked 0.50 packs per day. He has never used smokeless tobacco.      Appropriate preventive services were discussed with this patient, including applicable screening as appropriate for cardiovascular disease, diabetes, osteopenia/osteoporosis, and glaucoma.  As appropriate for age/gender, discussed screening for colorectal cancer, prostate cancer, breast cancer, and cervical cancer. Checklist reviewing preventive services available has been " given to the patient.    Reviewed patients plan of care and provided an AVS. The Basic Care Plan (routine screening as documented in Health Maintenance) for Juanjo meets the Care Plan requirement. This Care Plan has been established and reviewed with the Patient.    Counseling Resources:  ATP IV Guidelines  Pooled Cohorts Equation Calculator  Breast Cancer Risk Calculator  Breast Cancer: Medication to Reduce Risk  FRAX Risk Assessment  ICSI Preventive Guidelines  Dietary Guidelines for Americans, 2010  [employer]'s MyPlate  ASA Prophylaxis  Lung CA Screening    Kike Davidson MD  [facility]    Identified Health Risks:

## 2021-05-15 LAB
CREAT UR-MCNC: 191 MG/DL
MICROALBUMIN UR-MCNC: 12 MG/L
MICROALBUMIN/CREAT UR: 6.18 MG/G CR (ref 0–17)

## 2021-05-15 NOTE — RESULT ENCOUNTER NOTE
Please notify patient of the following lab results,    Juanjo, reviewed your labs    EKG  shows normal sinus rhythm, QT is not prolonged which is reassuring    Lipid panel shows numbers including LDL at goal, HDL at goal, slightly elevated triglycerides at 221 but decreased from 339, 1 year ago.  Please continue to endorse lifestyle changes, increase exercise activities; brisk walking 30 minutes 5 times a week and low-fat low-cholesterol diet and weight loss as tolerated.    Chemistry shows normal electrolytes, normal kidney function test, normal calcium level, normal total protein albumin, and normal liver enzymes called ALT and AST.    CBC shows normal white cell count, normal hemoglobin hematocrit there is no anemia, and normal platelet count.  Urine microalbumin is negative; means there is no protein in the urine and is reassuring.    Any further questions please let me know  Dr. Davidson

## 2021-05-17 ENCOUNTER — TELEPHONE (OUTPATIENT)
Dept: FAMILY MEDICINE | Facility: CLINIC | Age: 36
End: 2021-05-17

## 2021-05-17 DIAGNOSIS — H60.502 ACUTE OTITIS EXTERNA OF LEFT EAR, UNSPECIFIED TYPE: Primary | ICD-10-CM

## 2021-05-17 RX ORDER — CIPROFLOXACIN 0.5 MG/.25ML
0.25 SOLUTION/ DROPS AURICULAR (OTIC) 2 TIMES DAILY
Qty: 5 ML | Refills: 0 | Status: SHIPPED | OUTPATIENT
Start: 2021-05-17 | End: 2024-04-30

## 2021-05-17 NOTE — TELEPHONE ENCOUNTER
Patient Contact    Attempt # 1    Was call answered?  No.  Left message on voicemail with information to call back.    Lubna TRIMBLE RN

## 2021-05-17 NOTE — TELEPHONE ENCOUNTER
Reason for Call:  Medication or medication refill:    Do you use a [facility]?  Name of the pharmacy and phone number for the current request:  [id number] - [place], [place] - [address]    Name of the medication requested: alt to: ciprofloxacin (CETRAXAL) 0.2 % otic solution     Other request: Pharma called stating ciprofloxacin (CETRAXAL) 0.2 % otic solution was not covered but the ciprofloxacin 0.3% ophthalmic solution is covered. Wondering if a change can be made    Can we leave a detailed message on this number? YES    Phone number pharma can be reached at: [phone]    Best Time: any    Call taken on 5/17/2021 at 12:21 PM by Cornelia Lantigua

## 2021-05-17 NOTE — TELEPHONE ENCOUNTER
----- Message from Kike Davidson MD sent at 5/15/2021 11:26 AM CDT -----  Please notify patient of the following lab results,    Juanjo, reviewed your labs    EKG  shows normal sinus rhythm, QT is not prolonged which is reassuring    Lipid panel shows numbers including LDL at goal, HDL at goal, slightly elevated triglycerides at 221 but decreased from 339, 1 year ago.  Please continue to endorse lifestyle changes, increase exercise activities; brisk walking 30 minutes 5 times a week and low-fat low-cholesterol diet and weight loss as tolerated.    Chemistry shows normal electrolytes, normal kidney function test, normal calcium level, normal total protein albumin, and normal liver enzymes called ALT and AST.    CBC shows normal white cell count, normal hemoglobin hematocrit there is no anemia, and normal platelet count.  Urine microalbumin is negative; means there is no protein in the urine and is reassuring.    Any further questions please let me know  Dr. Davidson    *mailed letter to pt*

## 2021-05-18 NOTE — TELEPHONE ENCOUNTER
Please advise patient follow up if persistent or worsening ear pain. Will hold on antibiotics ear drops till patient is re-evaluated.

## 2021-05-19 NOTE — TELEPHONE ENCOUNTER
Left message asking patient to call back     *please also see other encounter if pt calls back (regarding lab results)   Lubna TRIMBLE RN

## 2021-05-19 NOTE — TELEPHONE ENCOUNTER
Patient Contact    Attempt # 2    Was call answered?  No.  Left message on voicemail with information to call back.    *letter already mailed to pt     If pt calls back, please also see other encounter regarding ear drops

## 2021-05-20 NOTE — TELEPHONE ENCOUNTER
"Patient Contact    Attempt # 2    Was call answered?  No.  Left message on answering machine for patient to call back.     On call back:  Please forward to [facility] Triage \"green calls\" line (David BORDEN RN) at [phone]       Val GAGNON RN,BSN    May 20, 2021  11:19 AM      "

## 2021-05-20 NOTE — TELEPHONE ENCOUNTER
Pt called back and states he is not having any more ear pain.  Did not  the rx from the pharmacy and was waiting for them to be delivered to his house.  Pt was given Dr. Davidson's message below and does not think he needs medication at this time.    Maris OLIVARES RN  EP Triage

## 2021-09-04 ENCOUNTER — HEALTH MAINTENANCE LETTER (OUTPATIENT)
Age: 36
End: 2021-09-04

## 2021-11-05 DIAGNOSIS — E78.5 HYPERLIPIDEMIA, UNSPECIFIED HYPERLIPIDEMIA TYPE: ICD-10-CM

## 2021-11-05 DIAGNOSIS — I10 ESSENTIAL HYPERTENSION: ICD-10-CM

## 2021-11-08 RX ORDER — ATORVASTATIN CALCIUM 40 MG/1
40 TABLET, FILM COATED ORAL DAILY
Qty: 90 TABLET | Refills: 0 | Status: SHIPPED | OUTPATIENT
Start: 2021-11-08 | End: 2022-02-03

## 2021-11-08 RX ORDER — AMLODIPINE BESYLATE 5 MG/1
5 TABLET ORAL DAILY
Qty: 90 TABLET | Refills: 0 | Status: SHIPPED | OUTPATIENT
Start: 2021-11-08 | End: 2022-02-03

## 2021-11-08 NOTE — TELEPHONE ENCOUNTER
Routing refill request to provider for review/approval because:  Drug interaction warning - very high; but with topical   Cyndy Hernandez RN

## 2021-11-19 ENCOUNTER — VIRTUAL VISIT (OUTPATIENT)
Dept: FAMILY MEDICINE | Facility: CLINIC | Age: 36
End: 2021-11-19
Payer: MEDICARE

## 2021-11-19 DIAGNOSIS — E66.01 CLASS 3 SEVERE OBESITY WITH SERIOUS COMORBIDITY AND BODY MASS INDEX (BMI) OF 40.0 TO 44.9 IN ADULT, UNSPECIFIED OBESITY TYPE (H): ICD-10-CM

## 2021-11-19 DIAGNOSIS — I10 PRIMARY HYPERTENSION: ICD-10-CM

## 2021-11-19 DIAGNOSIS — R53.83 OTHER FATIGUE: ICD-10-CM

## 2021-11-19 DIAGNOSIS — E78.5 HYPERLIPIDEMIA LDL GOAL <100: Primary | ICD-10-CM

## 2021-11-19 DIAGNOSIS — E66.813 CLASS 3 SEVERE OBESITY WITH SERIOUS COMORBIDITY AND BODY MASS INDEX (BMI) OF 40.0 TO 44.9 IN ADULT, UNSPECIFIED OBESITY TYPE (H): ICD-10-CM

## 2021-11-19 PROCEDURE — 99214 OFFICE O/P EST MOD 30 MIN: CPT | Mod: 95 | Performed by: INTERNAL MEDICINE

## 2021-11-19 NOTE — PROGRESS NOTES
Juanjo is a 36 year old who is being evaluated via a billable video visit.      How would you like to obtain your AVS? MyChart  If the video visit is dropped, the invitation should be resent by: Text to cell phone: [phone]  Will anyone else be joining your video visit? No      Video Start Time: 1:57 PM    Assessment & Plan   Problem List Items Addressed This Visit     None      Visit Diagnoses     Hyperlipidemia LDL goal <100    -  Primary    Relevant Orders    Lipid panel reflex to direct LDL Fasting    Primary hypertension        Relevant Orders    Comprehensive metabolic panel (BMP + Alb, Alk Phos, ALT, AST, Total. Bili, TP)    Albumin Random Urine Quantitative with Creat Ratio    Class 3 severe obesity with serious comorbidity and body mass index (BMI) of 40.0 to 44.9 in adult, unspecified obesity type (H)        Relevant Orders    Comprehensive Weight Management    Other fatigue        Relevant Orders    SLEEP EVALUATION & MANAGEMENT REFERRAL - ADULT -    TSH with free T4 reflex         counseled on weight loss and checking BP ,     Sleeping well with psych meds, no problems waking up in middle of night or gasping for air,     Feels tired during, agrees with sleep medicine, and wt management    sometimes if gets up quickly from sitting feels slightly light headed,     Risperdal 4 mg pm , and 2 mg at am, 0.5 mg as needed;     Patient agreement for both referral sleep medicine and weight management.  Counseled lifestyle changes low-fat low-cholesterol diet increase exercise activities.  Discussed fatigue differential diagnosis including but not limited to sleep apnea we will check a TSH level.  Advised to record blood pressure morning and evening and send us blood pressure readings.  We discussed side effects of psych medication including Risperdal risk of cataracts increase triglyceride etc. he needs an eye exam.  Once he symptoms blood pressure readings will adjust his blood pressure medications  "accordingly.  He describes occasional orthostatic dizziness when he stands up fast advised to keep well-hydrated.  Follow-up in 3 months and as needed.           BMI:   Estimated body mass index is 47.85 kg/m  as calculated from the following:    Height as of 5/14/21: 1.753 m (5' 9\").    Weight as of 5/14/21: 147 kg (324 lb).   Weight management plan: Discussed healthy diet and exercise guidelines    Work on weight loss  Regular exercise  See Patient Instructions    Return in about 3 months (around 2/19/2022), or if symptoms worsen or fail to improve, for As needed and if symptoms worsen, BP Recheck, Lab Work, Physical Exam.    Kike Davidson MD  [facility] [place] Ge[place] is a 36 year old who presents for the following health issues   HPI     Wants telephone,     Not checking BP, last was few weeks,   Presenting for follow-up.  She is not able to decrease his cravings for eating.  Has not been very physically active he gained some weight over the last couple months since last seen.  He has not been checking his blood pressure as advised.  He has not follow-up with sleep medicine as advised.  Unsure if he snores but he does feel tired during the day.  He is following with psychiatry, he is on Risperdal and Zoloft, on mirtazapine at bedtime.  He denies nocturia he sleeps well during the night but again he is on medications that help him sleep.    Review of Systems   Constitutional, HEENT, cardiovascular, pulmonary, gi and gu systems are negative, except as otherwise noted.      Objective           Vitals:  No vitals were obtained today due to virtual visit.    Physical Exam   GENERAL: Healthy, alert and no distress  EYES: Eyes grossly normal to inspection.  No discharge or erythema, or obvious scleral/conjunctival abnormalities.  RESP: No audible wheeze, cough, or visible cyanosis.  No visible retractions or increased work of breathing.    SKIN: Visible skin clear. No significant rash, " abnormal pigmentation or lesions.  NEURO: Cranial nerves grossly intact.  Mentation and speech appropriate for age.  PSYCH: Mentation appears normal, affect normal/bright, judgement and insight intact, normal speech and appearance well-groomed.    Office Visit on 05/14/2021   Component Date Value Ref Range Status     Cholesterol 05/14/2021 185  <200 mg/dL Final     Triglycerides 05/14/2021 221* <150 mg/dL Final    Comment: Borderline high:  150-199 mg/dl  High:             200-499 mg/dl  Very high:       >499 mg/dl       HDL Cholesterol 05/14/2021 40  >39 mg/dL Final     LDL Cholesterol Calculated 05/14/2021 101* <100 mg/dL Final    Comment: Above desirable:  100-129 mg/dl  Borderline High:  130-159 mg/dL  High:             160-189 mg/dL  Very high:       >189 mg/dl       Non HDL Cholesterol 05/14/2021 145* <130 mg/dL Final    Comment: Above Desirable:  130-159 mg/dl  Borderline high:  160-189 mg/dl  High:             190-219 mg/dl  Very high:       >219 mg/dl       Sodium 05/14/2021 139  133 - 144 mmol/L Final     Potassium 05/14/2021 4.0  3.4 - 5.3 mmol/L Final     Chloride 05/14/2021 105  94 - 109 mmol/L Final     Carbon Dioxide 05/14/2021 27  20 - 32 mmol/L Final     Anion Gap 05/14/2021 7  3 - 14 mmol/L Final     Glucose 05/14/2021 98  70 - 99 mg/dL Final     Urea Nitrogen 05/14/2021 13  7 - 30 mg/dL Final     Creatinine 05/14/2021 0.85  0.66 - 1.25 mg/dL Final     GFR Estimate 05/14/2021 >90  >60 mL/min/[1.73_m2] Final    Comment: Non  GFR Calc  Starting 12/18/2018, serum creatinine based estimated GFR (eGFR) will be   calculated using the Chronic Kidney Disease Epidemiology Collaboration   (CKD-EPI) equation.       GFR Estimate If Black 05/14/2021 >90  >60 mL/min/[1.73_m2] Final    Comment:  GFR Calc  Starting 12/18/2018, serum creatinine based estimated GFR (eGFR) will be   calculated using the Chronic Kidney Disease Epidemiology Collaboration   (CKD-EPI) equation.        Calcium 05/14/2021 8.9  8.5 - 10.1 mg/dL Final     Bilirubin Total 05/14/2021 0.4  0.2 - 1.3 mg/dL Final     Albumin 05/14/2021 4.0  3.4 - 5.0 g/dL Final     Protein Total 05/14/2021 8.0  6.8 - 8.8 g/dL Final     Alkaline Phosphatase 05/14/2021 110  40 - 150 U/L Final     ALT 05/14/2021 41  0 - 70 U/L Final     AST 05/14/2021 14  0 - 45 U/L Final     WBC 05/14/2021 9.3  4.0 - 11.0 10e9/L Final     RBC Count 05/14/2021 5.03  4.4 - 5.9 10e12/L Final     Hemoglobin 05/14/2021 15.3  13.3 - 17.7 g/dL Final     Hematocrit 05/14/2021 44.7  40.0 - 53.0 % Final     MCV 05/14/2021 89  78 - 100 fl Final     MCH 05/14/2021 30.4  26.5 - 33.0 pg Final     MCHC 05/14/2021 34.2  31.5 - 36.5 g/dL Final     RDW 05/14/2021 13.7  10.0 - 15.0 % Final     Platelet Count 05/14/2021 233  150 - 450 10e9/L Final     Creatinine Urine 05/14/2021 191  mg/dL Final     Albumin Urine mg/L 05/14/2021 12  mg/L Final     Albumin Urine mg/g Cr 05/14/2021 6.18  0 - 17 mg/g Cr Final           Video-Visit Details    Type of service:  Video Visit    Video End Time:2:14 PM    Originating Location (pt. Location): Home    Distant Location (provider location):  [facility]     [facility]

## 2022-01-31 ENCOUNTER — VIRTUAL VISIT (OUTPATIENT)
Dept: SLEEP MEDICINE | Facility: CLINIC | Age: 37
End: 2022-01-31
Attending: INTERNAL MEDICINE
Payer: MEDICARE

## 2022-01-31 VITALS — HEIGHT: 69 IN | WEIGHT: 315 LBS | BODY MASS INDEX: 46.65 KG/M2

## 2022-01-31 DIAGNOSIS — R53.83 OTHER FATIGUE: ICD-10-CM

## 2022-01-31 DIAGNOSIS — E66.2 OBESITY HYPOVENTILATION SYNDROME (H): Primary | ICD-10-CM

## 2022-01-31 PROCEDURE — 99203 OFFICE O/P NEW LOW 30 MIN: CPT | Mod: 95 | Performed by: PSYCHIATRY & NEUROLOGY

## 2022-01-31 ASSESSMENT — SLEEP AND FATIGUE QUESTIONNAIRES
HOW LIKELY ARE YOU TO NOD OFF OR FALL ASLEEP IN A CAR, WHILE STOPPED FOR A FEW MINUTES IN TRAFFIC: WOULD NEVER DOZE
HOW LIKELY ARE YOU TO NOD OFF OR FALL ASLEEP WHILE SITTING INACTIVE IN A PUBLIC PLACE: SLIGHT CHANCE OF DOZING
HOW LIKELY ARE YOU TO NOD OFF OR FALL ASLEEP WHILE WATCHING TV: MODERATE CHANCE OF DOZING
HOW LIKELY ARE YOU TO NOD OFF OR FALL ASLEEP WHILE SITTING AND READING: MODERATE CHANCE OF DOZING
HOW LIKELY ARE YOU TO NOD OFF OR FALL ASLEEP WHILE LYING DOWN TO REST IN THE AFTERNOON WHEN CIRCUMSTANCES PERMIT: MODERATE CHANCE OF DOZING
HOW LIKELY ARE YOU TO NOD OFF OR FALL ASLEEP WHILE SITTING AND TALKING TO SOMEONE: SLIGHT CHANCE OF DOZING
HOW LIKELY ARE YOU TO NOD OFF OR FALL ASLEEP WHEN YOU ARE A PASSENGER IN A CAR FOR AN HOUR WITHOUT A BREAK: SLIGHT CHANCE OF DOZING
HOW LIKELY ARE YOU TO NOD OFF OR FALL ASLEEP WHILE SITTING QUIETLY AFTER LUNCH WITHOUT ALCOHOL: SLIGHT CHANCE OF DOZING

## 2022-01-31 ASSESSMENT — PAIN SCALES - GENERAL: PAINLEVEL: NO PAIN (0)

## 2022-01-31 ASSESSMENT — MIFFLIN-ST. JEOR: SCORE: 2381.89

## 2022-01-31 NOTE — PATIENT INSTRUCTIONS
Your BMI is Body mass index is 47.26 kg/m .  Weight management is a personal decision.  If you are interested in exploring weight loss strategies, the following discussion covers the approaches that may be successful. Body mass index (BMI) is one way to tell whether you are at a healthy weight, overweight, or obese. It measures your weight in relation to your height.  A BMI of 18.5 to 24.9 is in the healthy range. A person with a BMI of 25 to 29.9 is considered overweight, and someone with a BMI of 30 or greater is considered obese. More than two-thirds of American adults are considered overweight or obese.  Being overweight or obese increases the risk for further weight gain. Excess weight may lead to heart disease and diabetes.  Creating and following plans for healthy eating and physical activity may help you improve your health.  Weight control is part of healthy lifestyle and includes exercise, emotional health, and healthy eating habits. Careful eating habits lifelong are the mainstay of weight control. Though there are significant health benefits from weight loss, long-term weight loss with diet alone may be very difficult to achieve- studies show long-term success with dietary management in less than 10% of people. Attaining a healthy weight may be especially difficult to achieve in those with severe obesity. In some cases, medications, devices and surgical management might be considered.  What can you do?  If you are overweight or obese and are interested in methods for weight loss, you should discuss this with your provider.     Consider reducing daily calorie intake by 500 calories.     Keep a food journal.     Avoiding skipping meals, consider cutting portions instead.    Diet combined with exercise helps maintain muscle while optimizing fat loss. Strength training is particularly important for building and maintaining muscle mass. Exercise helps reduce stress, increase energy, and improves fitness.  Increasing exercise without diet control, however, may not burn enough calories to loose weight.       Start walking three days a week 10-20 minutes at a time    Work towards walking thirty minutes five days a week     Eventually, increase the speed of your walking for 1-2 minutes at time    And look into health and wellness programs that may be available through your health insurance provider, employer, local community center, or celeste club.

## 2022-01-31 NOTE — PROGRESS NOTES
Juanjo is a 36 year old who is being evaluated via a billable video visit.      Pt states he doesn't wake up refreshed, always tired,     How would you like to obtain your AVS? MyChart  If the video visit is dropped, the invitation should be resent by: Send to e-mail at: [email or web address]  Will anyone else be joining your video visit? No    Video Start Time: 1145  Video-Visit Details    Type of service:  Video Visit    Video End Time:1206    Originating Location (pt. Location): Home    Distant Location (provider location):  [facility] [place]     Platform used for Video Visit: SushilEndoDex       Brief sleep staff note    Sleep Medicine Consult    Patient is referred by Dr Davidson for non refreshing sleep and possible sleep disordered breathing.      Patient presents with concerns for possible sleep disordered breathing, both GHASSAN but also obesity hypoventilation.      Patient snores, is tired and has high blood pressure.  No known witnessed apneas.  His BMI is close to 50 and he wakes up with morning Headaches.  Occasional CO2 approaching high threshold of normal, raising question of OHS.     He works as a  at [employer].  Its a new job and he likes it.     He denies sleepiness while driving.     He has a history of insomnia but states melatonin, trazadone, mirtazapine work to help him sleep.  He has no insomnia complains at this time.     Assessment/Plan  Evaluate for obesity hypoventilation/sleep disordered breathing  In lab PSG with TCM monitoring  Follow up in clinic after study  Never drive if tired or sleepy    All questions were answered.    It is a great privilege being asked to participate in this patients care.  The patient has been advised on the importance in of never operating operating a motor vehicle while tired or sleepy.        I visited with the patient directly but also extensively reviewed chart and coordinated care. Total time spent in the care of this  patient today (Face-to-Face time + chart time, , and coordination of care) was 42 minutes.

## 2022-02-03 DIAGNOSIS — I10 ESSENTIAL HYPERTENSION: ICD-10-CM

## 2022-02-03 DIAGNOSIS — E78.5 HYPERLIPIDEMIA, UNSPECIFIED HYPERLIPIDEMIA TYPE: ICD-10-CM

## 2022-02-03 RX ORDER — AMLODIPINE BESYLATE 5 MG/1
5 TABLET ORAL DAILY
Qty: 90 TABLET | Refills: 1 | Status: SHIPPED | OUTPATIENT
Start: 2022-02-03 | End: 2022-07-28

## 2022-02-03 RX ORDER — ATORVASTATIN CALCIUM 40 MG/1
40 TABLET, FILM COATED ORAL DAILY
Qty: 90 TABLET | Refills: 1 | Status: SHIPPED | OUTPATIENT
Start: 2022-02-03 | End: 2022-07-28

## 2022-02-03 NOTE — TELEPHONE ENCOUNTER
Routing refill request to provider for review/approval because:  Drug interaction warning  Fe Arizmendi RN

## 2022-04-04 ENCOUNTER — TELEPHONE (OUTPATIENT)
Dept: SLEEP MEDICINE | Facility: CLINIC | Age: 37
End: 2022-04-04
Payer: MEDICARE

## 2022-04-04 NOTE — TELEPHONE ENCOUNTER
Reason for Call:  Other call back    Detailed comments: patient is calling to bran'd a sleep study but the order was cancelled and he is wondering why    Phone Number Patient can be reached at: Home number on file [phone] (home)    Best Time: anytime    Can we leave a detailed message on this number? YES    Call taken on 4/4/2022 at 3:30 PM by Saritha Vargas

## 2022-04-05 NOTE — TELEPHONE ENCOUNTER
LM for pt to call back so he can get schedule for sleep study. Order was not cancelled.    Richard BELLOA

## 2022-05-24 NOTE — NURSING NOTE
Attempted to call patient to schedule sleep study and follow-up with provider.  There was no answer, so LM on  with clinic contact information in case patient would like to call back and schedule. Sandra Foster, ELVIRA

## 2022-07-25 DIAGNOSIS — E78.5 HYPERLIPIDEMIA, UNSPECIFIED HYPERLIPIDEMIA TYPE: ICD-10-CM

## 2022-07-25 DIAGNOSIS — I10 ESSENTIAL HYPERTENSION: ICD-10-CM

## 2022-07-28 RX ORDER — AMLODIPINE BESYLATE 5 MG/1
5 TABLET ORAL DAILY
Qty: 90 TABLET | Refills: 0 | Status: SHIPPED | OUTPATIENT
Start: 2022-07-28 | End: 2022-10-12

## 2022-07-28 RX ORDER — ATORVASTATIN CALCIUM 40 MG/1
40 TABLET, FILM COATED ORAL DAILY
Qty: 90 TABLET | Refills: 0 | Status: SHIPPED | OUTPATIENT
Start: 2022-07-28 | End: 2022-10-12

## 2022-07-28 NOTE — TELEPHONE ENCOUNTER
Routing refill request to provider for review/approval because:  Pt is due for a visit.  Sent mychart today notifying patient along with scheduling link.  Added note to pharmacy as well.   Cyndy Hernandez RN

## 2022-08-06 ENCOUNTER — HEALTH MAINTENANCE LETTER (OUTPATIENT)
Age: 37
End: 2022-08-06

## 2022-10-09 DIAGNOSIS — I10 ESSENTIAL HYPERTENSION: ICD-10-CM

## 2022-10-09 DIAGNOSIS — E78.5 HYPERLIPIDEMIA, UNSPECIFIED HYPERLIPIDEMIA TYPE: ICD-10-CM

## 2022-10-12 RX ORDER — ATORVASTATIN CALCIUM 40 MG/1
40 TABLET, FILM COATED ORAL DAILY
Qty: 90 TABLET | Refills: 0 | Status: SHIPPED | OUTPATIENT
Start: 2022-10-12 | End: 2022-12-13

## 2022-10-12 RX ORDER — AMLODIPINE BESYLATE 5 MG/1
5 TABLET ORAL DAILY
Qty: 90 TABLET | Refills: 0 | Status: SHIPPED | OUTPATIENT
Start: 2022-10-12 | End: 2022-12-13

## 2022-10-12 NOTE — TELEPHONE ENCOUNTER
Routing atrovastatin efill request to provider for review/approval because:    LDL Cholesterol Calculated   Date Value Ref Range Status   05/14/2021 101 (H) <100 mg/dL Final     Comment:     Above desirable:  100-129 mg/dl  Borderline High:  130-159 mg/dL  High:             160-189 mg/dL  Very high:       >189 mg/dl       Routing amlodpine refill request to provider for review/approval because:    BP Readings from Last 3 Encounters:   05/14/21 133/80   01/04/20 130/84   12/20/19 120/80     Creatinine   Date Value Ref Range Status   05/14/2021 0.85 0.66 - 1.25 mg/dL Final       Last office vist: 11/19/21    Next office visit: none- routing to TC to assist with scheduling, Mychart sent 7/28/22 for patient to schedule but patient did not ready mychart.       Satya Acevedo, SIN  [facility]

## 2022-10-22 ENCOUNTER — HEALTH MAINTENANCE LETTER (OUTPATIENT)
Age: 37
End: 2022-10-22

## 2022-12-12 DIAGNOSIS — I10 ESSENTIAL HYPERTENSION: ICD-10-CM

## 2022-12-12 DIAGNOSIS — E78.5 HYPERLIPIDEMIA, UNSPECIFIED HYPERLIPIDEMIA TYPE: ICD-10-CM

## 2022-12-13 RX ORDER — ATORVASTATIN CALCIUM 40 MG/1
40 TABLET, FILM COATED ORAL DAILY
Qty: 90 TABLET | Refills: 0 | Status: SHIPPED | OUTPATIENT
Start: 2022-12-13 | End: 2023-03-15

## 2022-12-13 RX ORDER — AMLODIPINE BESYLATE 5 MG/1
5 TABLET ORAL DAILY
Qty: 90 TABLET | Refills: 0 | Status: SHIPPED | OUTPATIENT
Start: 2022-12-13 | End: 2023-03-15

## 2023-03-14 DIAGNOSIS — E78.5 HYPERLIPIDEMIA, UNSPECIFIED HYPERLIPIDEMIA TYPE: ICD-10-CM

## 2023-03-14 DIAGNOSIS — I10 ESSENTIAL HYPERTENSION: ICD-10-CM

## 2023-03-15 RX ORDER — ATORVASTATIN CALCIUM 40 MG/1
40 TABLET, FILM COATED ORAL DAILY
Qty: 21 TABLET | Refills: 0 | Status: SHIPPED | OUTPATIENT
Start: 2023-03-15 | End: 2023-04-17

## 2023-03-15 RX ORDER — AMLODIPINE BESYLATE 5 MG/1
5 TABLET ORAL DAILY
Qty: 21 TABLET | Refills: 0 | Status: SHIPPED | OUTPATIENT
Start: 2023-03-15 | End: 2023-04-17

## 2023-04-17 DIAGNOSIS — E78.5 HYPERLIPIDEMIA, UNSPECIFIED HYPERLIPIDEMIA TYPE: ICD-10-CM

## 2023-04-17 DIAGNOSIS — I10 ESSENTIAL HYPERTENSION: ICD-10-CM

## 2023-04-17 RX ORDER — AMLODIPINE BESYLATE 5 MG/1
5 TABLET ORAL DAILY
Qty: 21 TABLET | Refills: 0 | Status: SHIPPED | OUTPATIENT
Start: 2023-04-17 | End: 2024-01-23

## 2023-04-17 RX ORDER — ATORVASTATIN CALCIUM 40 MG/1
40 TABLET, FILM COATED ORAL DAILY
Qty: 21 TABLET | Refills: 0 | Status: SHIPPED | OUTPATIENT
Start: 2023-04-17 | End: 2023-12-29

## 2023-08-27 ENCOUNTER — HEALTH MAINTENANCE LETTER (OUTPATIENT)
Age: 38
End: 2023-08-27

## 2023-12-29 DIAGNOSIS — E78.5 HYPERLIPIDEMIA, UNSPECIFIED HYPERLIPIDEMIA TYPE: ICD-10-CM

## 2023-12-29 RX ORDER — ATORVASTATIN CALCIUM 40 MG/1
40 TABLET, FILM COATED ORAL DAILY
Qty: 21 TABLET | Refills: 0 | Status: SHIPPED | OUTPATIENT
Start: 2023-12-29 | End: 2024-01-15

## 2024-01-15 DIAGNOSIS — E78.5 HYPERLIPIDEMIA, UNSPECIFIED HYPERLIPIDEMIA TYPE: ICD-10-CM

## 2024-01-15 RX ORDER — ATORVASTATIN CALCIUM 40 MG/1
40 TABLET, FILM COATED ORAL DAILY
Qty: 30 TABLET | Refills: 0 | Status: SHIPPED | OUTPATIENT
Start: 2024-01-15 | End: 2024-03-25

## 2024-01-23 ENCOUNTER — MYC REFILL (OUTPATIENT)
Dept: FAMILY MEDICINE | Facility: CLINIC | Age: 39
End: 2024-01-23

## 2024-01-23 DIAGNOSIS — E78.5 HYPERLIPIDEMIA, UNSPECIFIED HYPERLIPIDEMIA TYPE: ICD-10-CM

## 2024-01-23 DIAGNOSIS — I10 ESSENTIAL HYPERTENSION: ICD-10-CM

## 2024-01-23 RX ORDER — ATORVASTATIN CALCIUM 40 MG/1
40 TABLET, FILM COATED ORAL DAILY
Qty: 30 TABLET | Refills: 0 | OUTPATIENT
Start: 2024-01-23

## 2024-01-23 RX ORDER — AMLODIPINE BESYLATE 5 MG/1
5 TABLET ORAL DAILY
Qty: 21 TABLET | Refills: 0 | Status: SHIPPED | OUTPATIENT
Start: 2024-01-23 | End: 2024-02-12

## 2024-02-11 DIAGNOSIS — I10 ESSENTIAL HYPERTENSION: ICD-10-CM

## 2024-02-12 RX ORDER — AMLODIPINE BESYLATE 5 MG/1
5 TABLET ORAL DAILY
Qty: 14 TABLET | Refills: 0 | Status: SHIPPED | OUTPATIENT
Start: 2024-02-12 | End: 2024-03-14

## 2024-03-13 DIAGNOSIS — I10 ESSENTIAL HYPERTENSION: ICD-10-CM

## 2024-03-14 RX ORDER — AMLODIPINE BESYLATE 5 MG/1
5 TABLET ORAL DAILY
Qty: 14 TABLET | Refills: 0 | Status: SHIPPED | OUTPATIENT
Start: 2024-03-14 | End: 2024-03-25

## 2024-03-15 NOTE — TELEPHONE ENCOUNTER
Tried to contact patient, unable to leave message VM was full     Austin Lopez MA on 3/15/2024 at 7:22 AM

## 2024-03-15 NOTE — TELEPHONE ENCOUNTER
PT has scheduled appt with pablo andrade 3/26/2024 @[facility].    Austin Lopez MA on 3/15/2024 at 7:23 AM

## 2024-03-21 DIAGNOSIS — I10 ESSENTIAL HYPERTENSION: ICD-10-CM

## 2024-03-21 RX ORDER — AMLODIPINE BESYLATE 5 MG/1
TABLET ORAL
Qty: 14 TABLET | Refills: 0 | OUTPATIENT
Start: 2024-03-21

## 2024-03-24 DIAGNOSIS — I10 ESSENTIAL HYPERTENSION: ICD-10-CM

## 2024-03-24 DIAGNOSIS — E78.5 HYPERLIPIDEMIA, UNSPECIFIED HYPERLIPIDEMIA TYPE: ICD-10-CM

## 2024-03-25 RX ORDER — AMLODIPINE BESYLATE 5 MG/1
5 TABLET ORAL DAILY
Qty: 14 TABLET | Refills: 0 | Status: SHIPPED | OUTPATIENT
Start: 2024-03-25 | End: 2024-04-30

## 2024-03-25 RX ORDER — ATORVASTATIN CALCIUM 40 MG/1
40 TABLET, FILM COATED ORAL DAILY
Qty: 14 TABLET | Refills: 0 | Status: SHIPPED | OUTPATIENT
Start: 2024-03-25 | End: 2024-04-30

## 2024-03-26 NOTE — TELEPHONE ENCOUNTER
Patient was last seen in November 2021.  Did patient establish somewhere else?  Further refills would necessitate office visit and labs   PROVIDER:[TOKEN:[[id number]],FOLLOWUP:[2 weeks]]

## 2024-04-30 ENCOUNTER — OFFICE VISIT (OUTPATIENT)
Dept: FAMILY MEDICINE | Facility: CLINIC | Age: 39
End: 2024-04-30
Payer: MEDICARE

## 2024-04-30 VITALS
RESPIRATION RATE: 18 BRPM | TEMPERATURE: 97.2 F | HEART RATE: 87 BPM | WEIGHT: 315 LBS | BODY MASS INDEX: 46.65 KG/M2 | DIASTOLIC BLOOD PRESSURE: 92 MMHG | SYSTOLIC BLOOD PRESSURE: 152 MMHG | OXYGEN SATURATION: 99 % | HEIGHT: 69 IN

## 2024-04-30 DIAGNOSIS — F41.8 DEPRESSION WITH ANXIETY: ICD-10-CM

## 2024-04-30 DIAGNOSIS — R94.6 ABNORMAL RESULTS OF THYROID FUNCTION STUDIES: ICD-10-CM

## 2024-04-30 DIAGNOSIS — H61.23 BILATERAL IMPACTED CERUMEN: ICD-10-CM

## 2024-04-30 DIAGNOSIS — Z13.1 SCREENING FOR DIABETES MELLITUS: ICD-10-CM

## 2024-04-30 DIAGNOSIS — E78.5 HYPERLIPIDEMIA, UNSPECIFIED HYPERLIPIDEMIA TYPE: ICD-10-CM

## 2024-04-30 DIAGNOSIS — Z00.00 ANNUAL PHYSICAL EXAM: Primary | ICD-10-CM

## 2024-04-30 DIAGNOSIS — E66.01 MORBID OBESITY (H): ICD-10-CM

## 2024-04-30 DIAGNOSIS — Z23 NEED FOR VACCINATION: ICD-10-CM

## 2024-04-30 DIAGNOSIS — I10 ESSENTIAL HYPERTENSION: ICD-10-CM

## 2024-04-30 DIAGNOSIS — Z71.6 ENCOUNTER FOR TOBACCO USE CESSATION COUNSELING: ICD-10-CM

## 2024-04-30 LAB
ALBUMIN SERPL BCG-MCNC: 4.5 G/DL (ref 3.5–5.2)
ALP SERPL-CCNC: 74 U/L (ref 40–150)
ALT SERPL W P-5'-P-CCNC: 48 U/L (ref 0–70)
ANION GAP SERPL CALCULATED.3IONS-SCNC: 11 MMOL/L (ref 7–15)
AST SERPL W P-5'-P-CCNC: 30 U/L (ref 0–45)
BILIRUB SERPL-MCNC: 0.2 MG/DL
BUN SERPL-MCNC: 8.2 MG/DL (ref 6–20)
CALCIUM SERPL-MCNC: 9.6 MG/DL (ref 8.6–10)
CHLORIDE SERPL-SCNC: 106 MMOL/L (ref 98–107)
CHOLEST SERPL-MCNC: 236 MG/DL
CREAT SERPL-MCNC: 0.92 MG/DL (ref 0.67–1.17)
DEPRECATED HCO3 PLAS-SCNC: 23 MMOL/L (ref 22–29)
EGFRCR SERPLBLD CKD-EPI 2021: >90 ML/MIN/1.73M2
FASTING STATUS PATIENT QL REPORTED: YES
GLUCOSE SERPL-MCNC: 90 MG/DL (ref 70–99)
HBA1C MFR BLD: 5.6 % (ref 0–5.6)
HDLC SERPL-MCNC: 39 MG/DL
LDLC SERPL CALC-MCNC: 163 MG/DL
NONHDLC SERPL-MCNC: 197 MG/DL
POTASSIUM SERPL-SCNC: 4.5 MMOL/L (ref 3.4–5.3)
PROT SERPL-MCNC: 7.6 G/DL (ref 6.4–8.3)
SODIUM SERPL-SCNC: 140 MMOL/L (ref 135–145)
TRIGL SERPL-MCNC: 172 MG/DL
TSH SERPL DL<=0.005 MIU/L-ACNC: 1.58 UIU/ML (ref 0.3–4.2)

## 2024-04-30 PROCEDURE — G0008 ADMIN INFLUENZA VIRUS VAC: HCPCS

## 2024-04-30 PROCEDURE — 91320 SARSCV2 VAC 30MCG TRS-SUC IM: CPT

## 2024-04-30 PROCEDURE — 36415 COLL VENOUS BLD VENIPUNCTURE: CPT

## 2024-04-30 PROCEDURE — 90686 IIV4 VACC NO PRSV 0.5 ML IM: CPT

## 2024-04-30 PROCEDURE — 80061 LIPID PANEL: CPT

## 2024-04-30 PROCEDURE — 84443 ASSAY THYROID STIM HORMONE: CPT

## 2024-04-30 PROCEDURE — 99214 OFFICE O/P EST MOD 30 MIN: CPT | Mod: 25

## 2024-04-30 PROCEDURE — G0439 PPPS, SUBSEQ VISIT: HCPCS

## 2024-04-30 PROCEDURE — 80053 COMPREHEN METABOLIC PANEL: CPT

## 2024-04-30 PROCEDURE — 90480 ADMN SARSCOV2 VAC 1/ONLY CMP: CPT

## 2024-04-30 PROCEDURE — 83036 HEMOGLOBIN GLYCOSYLATED A1C: CPT

## 2024-04-30 RX ORDER — FINASTERIDE 1 MG/1
TABLET, FILM COATED ORAL DAILY
COMMUNITY
Start: 2024-04-30

## 2024-04-30 RX ORDER — AMLODIPINE BESYLATE 5 MG/1
5 TABLET ORAL DAILY
Qty: 90 TABLET | Refills: 3 | Status: SHIPPED | OUTPATIENT
Start: 2024-04-30

## 2024-04-30 RX ORDER — GABAPENTIN 300 MG/1
CAPSULE ORAL
COMMUNITY
Start: 2024-01-25

## 2024-04-30 RX ORDER — ATORVASTATIN CALCIUM 40 MG/1
40 TABLET, FILM COATED ORAL DAILY
Qty: 90 TABLET | Refills: 3 | Status: SHIPPED | OUTPATIENT
Start: 2024-04-30

## 2024-04-30 RX ORDER — RISPERIDONE 1 MG/1
8 TABLET ORAL DAILY
COMMUNITY
Start: 2024-01-25

## 2024-04-30 SDOH — HEALTH STABILITY: PHYSICAL HEALTH: ON AVERAGE, HOW MANY DAYS PER WEEK DO YOU ENGAGE IN MODERATE TO STRENUOUS EXERCISE (LIKE A BRISK WALK)?: 0 DAYS

## 2024-04-30 SDOH — HEALTH STABILITY: PHYSICAL HEALTH: ON AVERAGE, HOW MANY MINUTES DO YOU ENGAGE IN EXERCISE AT THIS LEVEL?: 0 MIN

## 2024-04-30 ASSESSMENT — PAIN SCALES - GENERAL: PAINLEVEL: NO PAIN (0)

## 2024-04-30 ASSESSMENT — SOCIAL DETERMINANTS OF HEALTH (SDOH): HOW OFTEN DO YOU GET TOGETHER WITH FRIENDS OR RELATIVES?: ONCE A WEEK

## 2024-04-30 NOTE — PROGRESS NOTES
Preventive Care Visit  [facility] ODILON Caceres DNP, Geriatric Medicine  Apr 30, 2024      Assessment & Plan     Annual physical exam  Will update health maintenance, labs, and vaccines today    Morbid obesity (H)  On metformin with minimal success, patient be interested referral to look into surgical intervention  - Adult Comprehensive Weight Management  Referral; Future    Essential hypertension  Uncontrolled in clinic, however he has been out of medication for couple weeks.  Will restart amlodipine start checking daily blood pressures to ensure averages less than 130/80. Will follow-up in 4 weeks to reassess  - amLODIPine (NORVASC) 5 MG tablet; Take 1 tablet (5 mg) by mouth daily  - Comprehensive metabolic panel    Abnormal results of thyroid function studies  Noted on former labs, will repeat  - TSH with free T4 reflex    Hyperlipidemia, unspecified hyperlipidemia type  Previously tolerated Lipitor, however has been out of medication for the past month.  Will recheck labs and plan to restart medication  - atorvastatin (LIPITOR) 40 MG tablet; Take 1 tablet (40 mg) by mouth daily  - Lipid Profile  - Comprehensive metabolic panel    Encounter for tobacco use cessation counseling  Uses nicotine vape, interested in cessation.  Will provide Nicorette gum per request  - nicotine (NICORETTE) 2 MG gum; Place 1 each (2 mg) inside cheek every hour as needed for nicotine withdrawal symptoms    Screening for diabetes mellitus  - Hemoglobin A1c    Depression with anxiety  Follows with [facility], continue sertraline    Bilateral impacted cerumen  -REMOVE IMPACTED CERUMEN    Need for vaccination  - INFLUENZA VACCINE IM > 6 MONTHS VALENT IIV4 (AFLURIA/FLUZONE)  - COVID-19 12+ (2023-24) (PFIZER)      35 minutes spent by me on the date of the encounter doing chart review, history and exam, documentation and further activities per the note      BMI  Estimated body mass index is 48.13 kg/m  as  "calculated from the following:    Height as of this encounter: 1.753 m (5' 9\").    Weight as of this encounter: 147.8 kg (325 lb 14.4 oz).   Weight management plan: Patient referred to endocrine and/or weight management specialty      FUTURE APPOINTMENTS:       - Follow-up office or E-visit in 4 weeks       - Follow-up for annual visit or as needed  SELF MONITORING:       - Please check blood pressure readings daily    Cheri Geiger is a 38 year old, presenting for the following:  Physical        Health Care Directive  Patient does not have a Health Care Directive or Living Will: Discussed advance care planning with patient; however, patient declined at this time.    Here for physical  Has been out of BP meds for a couple weeks and cholesterol medicine for about a month  Uses nicotine vape, notes some occasional chest soreness after using that  Sees [facility] for mental health and med management, gets EKGs done through them  Taking Metformin for appetite suppression. Has had some ongoing weight challenges and is interested in surgical consult for bariatric surgery                  4/30/2024   General Health   How would you rate your overall physical health? (!) FAIR   Feel stress (tense, anxious, or unable to sleep) To some extent   (!) STRESS CONCERN      4/30/2024   Nutrition   Diet: Regular (no restrictions)         4/30/2024   Exercise   Days per week of moderate/strenous exercise 0 days   Average minutes spent exercising at this level 0 min   (!) EXERCISE CONCERN      4/30/2024   Social Factors   Frequency of gathering with friends or relatives Once a week   Worry food won't last until get money to buy more No   Food not last or not have enough money for food? No   Do you have housing?  Yes   Are you worried about losing your housing? No   Lack of transportation? No   Unable to get utilities (heat,electricity)? No         4/30/2024   Fall Risk   Fallen 2 or more times in the past year? No   Trouble " with walking or balance? No          2024   Activities of Daily Living- Home Safety   Needs help with the following daily activites None of the above   Safety concerns in the home None of the above         2024   Dental   Dentist two times every year? (!) NO         2024   Hearing Screening   Hearing concerns? None of the above         2024   Driving Risk Screening   Patient/family members have concerns about driving No         2024   General Alertness/Fatigue Screening   Have you been more tired than usual lately? (!) YES         2024   Urinary Incontinence Screening   Bothered by leaking urine in past 6 months No         2024   TB Screening   Were you born outside of the [place]? No         Today's PHQ-2 Score:       2024     7:51 AM   PHQ-2 (  [employer])   Q1: Little interest or pleasure in doing things 1   Q2: Feeling down, depressed or hopeless 1   PHQ-2 Score 2   Q1: Little interest or pleasure in doing things Several days   Q2: Feeling down, depressed or hopeless Several days   PHQ-2 Score 2           2024   Substance Use   Alcohol more than 3/day or more than 7/wk No   Do you have a current opioid prescription? No   How severe/bad is pain from 1 to 10? 0/10 (No Pain)   Do you use any other substances recreationally? (!) ALCOHOL    (!) CANNABIS PRODUCTS     Social History     Tobacco Use    Smoking status: Former     Current packs/day: 0.00     Types: Cigarettes     Quit date: 2008     Years since quittin.2    Smokeless tobacco: Never    Tobacco comments:     occasional   Substance Use Topics    Alcohol use: Yes     Comment: very occasionally    Drug use: Yes     Types: Marijuana     Comment: occasional             2024   Contraception/Family Planning   Questions about contraception or family planning No     Reviewed and updated as needed this visit by Provider   Tobacco   Meds  Problems  Med Hx  Surg Hx  Fam Hx  Soc Hx Sexual   Activity       "    Past Medical History:   Diagnosis Date    Depression with anxiety     Mood disorder (H24) 6/10/2013    Other acne     Schizophrenia (H)     Suicidal ideation 7/1/2013     Past Surgical History:   Procedure Laterality Date    CIRCUMCISION      Adult 04/16/2013    [facility] APPENDECTOMY  11-    viral, not supperative at surgery     Lab work is in process  Current providers sharing in care for this patient include:  Patient Care Team:  Kike Davidson MD as PCP - General (Internal Medicine)  Kike Davidson MD as Assigned PCP    The following health maintenance items are reviewed in Epic and correct as of today:  Health Maintenance   Topic Date Due    ANNUAL REVIEW OF HM ORDERS  Never done    HEPATITIS C SCREENING  Never done    MEDICARE ANNUAL WELLNESS VISIT  05/14/2022    LIPID  05/14/2022    INFLUENZA VACCINE (1) 09/01/2023    COVID-19 Vaccine (5 - 2023-24 season) 09/01/2023    GLUCOSE  05/14/2024    DTAP/TDAP/TD IMMUNIZATION (10 - Td or Tdap) 07/22/2024    ADVANCE CARE PLANNING  05/14/2026    HIV SCREENING  Completed    PHQ-2 (once per calendar year)  Completed    IPV IMMUNIZATION  Completed    HEPATITIS B IMMUNIZATION  Completed    Pneumococcal Vaccine: Pediatrics (0 to 5 Years) and At-Risk Patients (6 to 64 Years)  Aged Out    HPV IMMUNIZATION  Aged Out    MENINGITIS IMMUNIZATION  Aged Out    RSV MONOCLONAL ANTIBODY  Aged Out         Review of Systems  Constitutional, HEENT, cardiovascular, pulmonary, gi and gu systems are negative, except as otherwise noted.     Objective    Exam  BP (!) 152/92 (BP Location: Right arm, Patient Position: Sitting, Cuff Size: Adult Large)   Pulse 87   Temp 97.2  F (36.2  C) (Temporal)   Resp 18   Ht 1.753 m (5' 9\")   Wt 147.8 kg (325 lb 14.4 oz)   SpO2 99%   BMI 48.13 kg/m     Estimated body mass index is 48.13 kg/m  as calculated from the following:    Height as of this encounter: 1.753 m (5' 9\").    Weight as of this encounter: 147.8 kg (325 lb 14.4 " oz).    Physical Exam  Vitals reviewed.   Constitutional:       Appearance: Normal appearance.   HENT:      Head: Normocephalic.      Right Ear: Ear canal and external ear normal. There is impacted cerumen.      Left Ear: Ear canal and external ear normal. There is impacted cerumen.      Mouth/Throat:      Mouth: Mucous membranes are moist.   Eyes:      Pupils: Pupils are equal, round, and reactive to light.   Cardiovascular:      Rate and Rhythm: Normal rate and regular rhythm.      Pulses: Normal pulses.      Heart sounds: Normal heart sounds. No murmur heard.  Pulmonary:      Effort: Pulmonary effort is normal. No respiratory distress.      Breath sounds: Normal breath sounds. No wheezing, rhonchi or rales.   Abdominal:      General: Bowel sounds are normal. There is no distension.      Palpations: Abdomen is soft. There is no mass.      Tenderness: There is no abdominal tenderness.   Musculoskeletal:         General: Normal range of motion.      Cervical back: Normal range of motion and neck supple.      Right lower leg: No edema.      Left lower leg: No edema.   Skin:     General: Skin is warm and dry.   Neurological:      Mental Status: He is alert and oriented to person, place, and time.   Psychiatric:         Attention and Perception: Attention normal.         Mood and Affect: Mood normal.         Behavior: Behavior normal.         Cognition and Memory: Cognition normal.         Judgment: Judgment normal.                4/30/2024   Mini Cog   Clock Draw Score 2 Normal   3 Item Recall 3 objects recalled   Mini Cog Total Score 5       Signed Electronically by: Jaquelin Caceres, DNP, APRN, CNP

## 2024-04-30 NOTE — COMMUNITY RESOURCES LIST (ENGLISH)
April 30, 2024           YOUR PERSONALIZED LIST OF SERVICES & PROGRAMS           & RECREATION    Sports      for Resources - Sports clubs and recreational activities  [address] [place] [address] (Distance: 0.8 miles)  Phone: [phone]  Website: http://www.[employer].org/  Language: English  Fee: Free      of the [place] - Sports clubs and recreational activities - [facility]  [address] [place] [address] (Distance: 4.1 miles)  Language: English  Fee: Self pay, Sliding scale      [employer]FT[employer]ALL  Website: http://www.[employer].org    Classes/Groups      in [employer] [place] [address] (Distance: 8.9 miles)  Phone: [phone]  Website: http://www.[facility].mn/  Language: English  Fee: Free      [facility] - Personal training  [address] [place] [address] (Distance: 18.0 miles)  Phone: [phone]  Website: https://www.[employer]/  Language: English, Omani  Fee: Sliding scale, Self pay, Insurance      [facility] - Adult Enrichment  Phone: [phone]  Website: https://[employer]/adults-seniors/adult-enrichment/  Language: English  Hours: Mon 7:30 AM - 4:00 PM Tue 7:30 AM - 4:00 PM Wed 7:30 AM - 4:00 PM Thu 7:30 AM - 4:00 PM Fri 7:30 AM - 4:00 PM               IMPORTANT NUMBERS & WEBSITES        Emergency Services  911  .   United Way  211 http://211unitedway.org  .   Poison Control  (259) 317-6246 http://[place]poison.org http://wis[place]poison.org  .     Suicide and Crisis Lifeline  988 http://988lifeline.org  .   Childhelp National Child Abuse Hotline  182.615.8015 http://Childhelphotline.org   .   National Sexual Assault Hotline  (730) 533-4599 ([place]) http://Rainn.org   .     National Runaway Safeline  (593) 910-4835 ([place]) http://1800runaway.org  .   Pregnancy & Postpartum Support  Call/text [phone]  [place]:  http://ppsupportmn.org  [place]: http://[employer].com/wi  .   Substance Abuse National Helpline ([facility])  800-982-HELP (4237) http://Findtreatment.gov   .                DISCLAIMER: These resources have been generated via the [employer]. [employer] does not endorse any service providers mentioned in this resource list. [employer] does not guarantee that the services mentioned in this resource list will be available to you or will improve your health or wellness.    [facility]

## 2025-01-21 ENCOUNTER — MEDICAL CORRESPONDENCE (OUTPATIENT)
Dept: HEALTH INFORMATION MANAGEMENT | Facility: CLINIC | Age: 40
End: 2025-01-21
Payer: MEDICARE

## 2025-04-21 DIAGNOSIS — E78.5 HYPERLIPIDEMIA, UNSPECIFIED HYPERLIPIDEMIA TYPE: ICD-10-CM

## 2025-04-21 RX ORDER — ATORVASTATIN CALCIUM 40 MG/1
40 TABLET, FILM COATED ORAL DAILY
Qty: 90 TABLET | Refills: 0 | Status: SHIPPED | OUTPATIENT
Start: 2025-04-21

## 2025-04-23 ENCOUNTER — PATIENT OUTREACH (OUTPATIENT)
Dept: CARE COORDINATION | Facility: CLINIC | Age: 40
End: 2025-04-23
Payer: MEDICARE

## 2025-04-30 ENCOUNTER — OFFICE VISIT (OUTPATIENT)
Dept: FAMILY MEDICINE | Facility: CLINIC | Age: 40
End: 2025-04-30
Payer: MEDICARE

## 2025-04-30 ENCOUNTER — TELEPHONE (OUTPATIENT)
Dept: FAMILY MEDICINE | Facility: CLINIC | Age: 40
End: 2025-04-30

## 2025-04-30 VITALS
WEIGHT: 315 LBS | TEMPERATURE: 98.6 F | SYSTOLIC BLOOD PRESSURE: 148 MMHG | BODY MASS INDEX: 46.65 KG/M2 | DIASTOLIC BLOOD PRESSURE: 85 MMHG | HEIGHT: 69 IN | RESPIRATION RATE: 16 BRPM | HEART RATE: 85 BPM | OXYGEN SATURATION: 98 %

## 2025-04-30 DIAGNOSIS — F12.20 CANNABIS DEPENDENCE, ABUSE (H): ICD-10-CM

## 2025-04-30 DIAGNOSIS — I10 PRIMARY HYPERTENSION: ICD-10-CM

## 2025-04-30 DIAGNOSIS — Z01.00 VISIT FOR EYE AND VISION EXAM: ICD-10-CM

## 2025-04-30 DIAGNOSIS — R06.09 DOE (DYSPNEA ON EXERTION): Primary | ICD-10-CM

## 2025-04-30 DIAGNOSIS — E66.01 MORBID OBESITY (H): ICD-10-CM

## 2025-04-30 DIAGNOSIS — E78.5 DYSLIPIDEMIA: ICD-10-CM

## 2025-04-30 DIAGNOSIS — Z23 NEED FOR VACCINATION: ICD-10-CM

## 2025-04-30 DIAGNOSIS — Z23 HIGH PRIORITY FOR 2019-NCOV VACCINE: ICD-10-CM

## 2025-04-30 DIAGNOSIS — F32.A ANXIETY AND DEPRESSION: ICD-10-CM

## 2025-04-30 DIAGNOSIS — F20.9 SCHIZOPHRENIA, UNSPECIFIED TYPE (H): ICD-10-CM

## 2025-04-30 DIAGNOSIS — Z78.9 MEDICALLY COMPLEX PATIENT: ICD-10-CM

## 2025-04-30 DIAGNOSIS — F41.8 DEPRESSION WITH ANXIETY: ICD-10-CM

## 2025-04-30 DIAGNOSIS — Z13.1 SCREENING FOR DIABETES MELLITUS: ICD-10-CM

## 2025-04-30 DIAGNOSIS — R73.03 PREDIABETES: Primary | ICD-10-CM

## 2025-04-30 DIAGNOSIS — R07.89 CHEST TIGHTNESS: ICD-10-CM

## 2025-04-30 DIAGNOSIS — F41.9 ANXIETY AND DEPRESSION: ICD-10-CM

## 2025-04-30 LAB
ALBUMIN SERPL BCG-MCNC: 4.6 G/DL (ref 3.5–5.2)
ALBUMIN UR-MCNC: NEGATIVE MG/DL
ALP SERPL-CCNC: 95 U/L (ref 40–150)
ALT SERPL W P-5'-P-CCNC: 68 U/L (ref 0–70)
ANION GAP SERPL CALCULATED.3IONS-SCNC: 13 MMOL/L (ref 7–15)
APPEARANCE UR: CLEAR
AST SERPL W P-5'-P-CCNC: 36 U/L (ref 0–45)
BASOPHILS # BLD AUTO: 0 10E3/UL (ref 0–0.2)
BASOPHILS NFR BLD AUTO: 0 %
BILIRUB SERPL-MCNC: 0.4 MG/DL
BILIRUB UR QL STRIP: NEGATIVE
BUN SERPL-MCNC: 8.5 MG/DL (ref 6–20)
CALCIUM SERPL-MCNC: 9.6 MG/DL (ref 8.8–10.4)
CHLORIDE SERPL-SCNC: 102 MMOL/L (ref 98–107)
CHOLEST SERPL-MCNC: 148 MG/DL
CK SERPL-CCNC: 300 U/L (ref 39–308)
COLOR UR AUTO: YELLOW
CREAT SERPL-MCNC: 1.1 MG/DL (ref 0.67–1.17)
CREAT UR-MCNC: 128 MG/DL
EGFRCR SERPLBLD CKD-EPI 2021: 89 ML/MIN/1.73M2
EOSINOPHIL # BLD AUTO: 0.3 10E3/UL (ref 0–0.7)
EOSINOPHIL NFR BLD AUTO: 2 %
ERYTHROCYTE [DISTWIDTH] IN BLOOD BY AUTOMATED COUNT: 12.9 % (ref 10–15)
EST. AVERAGE GLUCOSE BLD GHB EST-MCNC: 123 MG/DL
FASTING STATUS PATIENT QL REPORTED: NO
FASTING STATUS PATIENT QL REPORTED: NO
GLUCOSE SERPL-MCNC: 102 MG/DL (ref 70–99)
GLUCOSE UR STRIP-MCNC: NEGATIVE MG/DL
HBA1C MFR BLD: 5.9 % (ref 0–5.6)
HCO3 SERPL-SCNC: 26 MMOL/L (ref 22–29)
HCT VFR BLD AUTO: 45.5 % (ref 40–53)
HDLC SERPL-MCNC: 45 MG/DL
HGB BLD-MCNC: 14.8 G/DL (ref 13.3–17.7)
HGB UR QL STRIP: NEGATIVE
IMM GRANULOCYTES # BLD: 0 10E3/UL
IMM GRANULOCYTES NFR BLD: 0 %
KETONES UR STRIP-MCNC: NEGATIVE MG/DL
LDLC SERPL CALC-MCNC: 84 MG/DL
LEUKOCYTE ESTERASE UR QL STRIP: NEGATIVE
LYMPHOCYTES # BLD AUTO: 2.7 10E3/UL (ref 0.8–5.3)
LYMPHOCYTES NFR BLD AUTO: 25 %
MCH RBC QN AUTO: 28.5 PG (ref 26.5–33)
MCHC RBC AUTO-ENTMCNC: 32.5 G/DL (ref 31.5–36.5)
MCV RBC AUTO: 88 FL (ref 78–100)
MICROALBUMIN UR-MCNC: <12 MG/L
MICROALBUMIN/CREAT UR: NORMAL MG/G{CREAT}
MONOCYTES # BLD AUTO: 0.6 10E3/UL (ref 0–1.3)
MONOCYTES NFR BLD AUTO: 6 %
NEUTROPHILS # BLD AUTO: 7.1 10E3/UL (ref 1.6–8.3)
NEUTROPHILS NFR BLD AUTO: 66 %
NITRATE UR QL: NEGATIVE
NONHDLC SERPL-MCNC: 103 MG/DL
PH UR STRIP: 7 [PH] (ref 5–7)
PLATELET # BLD AUTO: 244 10E3/UL (ref 150–450)
POTASSIUM SERPL-SCNC: 4.7 MMOL/L (ref 3.4–5.3)
PROT SERPL-MCNC: 7.5 G/DL (ref 6.4–8.3)
RBC # BLD AUTO: 5.2 10E6/UL (ref 4.4–5.9)
RBC #/AREA URNS AUTO: NORMAL /HPF
SODIUM SERPL-SCNC: 141 MMOL/L (ref 135–145)
SP GR UR STRIP: 1.02 (ref 1–1.03)
TRIGL SERPL-MCNC: 97 MG/DL
TROPONIN T SERPL HS-MCNC: <6 NG/L
UROBILINOGEN UR STRIP-ACNC: 0.2 E.U./DL
WBC # BLD AUTO: 10.7 10E3/UL (ref 4–11)
WBC #/AREA URNS AUTO: NORMAL /HPF

## 2025-04-30 PROCEDURE — 82550 ASSAY OF CK (CPK): CPT | Performed by: INTERNAL MEDICINE

## 2025-04-30 PROCEDURE — 90715 TDAP VACCINE 7 YRS/> IM: CPT | Performed by: INTERNAL MEDICINE

## 2025-04-30 PROCEDURE — 91320 SARSCV2 VAC 30MCG TRS-SUC IM: CPT | Performed by: INTERNAL MEDICINE

## 2025-04-30 PROCEDURE — 85025 COMPLETE CBC W/AUTO DIFF WBC: CPT | Performed by: INTERNAL MEDICINE

## 2025-04-30 PROCEDURE — 93000 ELECTROCARDIOGRAM COMPLETE: CPT | Performed by: INTERNAL MEDICINE

## 2025-04-30 PROCEDURE — 82043 UR ALBUMIN QUANTITATIVE: CPT | Performed by: INTERNAL MEDICINE

## 2025-04-30 PROCEDURE — 81001 URINALYSIS AUTO W/SCOPE: CPT | Performed by: INTERNAL MEDICINE

## 2025-04-30 PROCEDURE — 90480 ADMN SARSCOV2 VAC 1/ONLY CMP: CPT | Performed by: INTERNAL MEDICINE

## 2025-04-30 PROCEDURE — G0439 PPPS, SUBSEQ VISIT: HCPCS | Performed by: INTERNAL MEDICINE

## 2025-04-30 PROCEDURE — 80061 LIPID PANEL: CPT | Performed by: INTERNAL MEDICINE

## 2025-04-30 PROCEDURE — 3079F DIAST BP 80-89 MM HG: CPT | Performed by: INTERNAL MEDICINE

## 2025-04-30 PROCEDURE — 90471 IMMUNIZATION ADMIN: CPT | Performed by: INTERNAL MEDICINE

## 2025-04-30 PROCEDURE — 3077F SYST BP >= 140 MM HG: CPT | Performed by: INTERNAL MEDICINE

## 2025-04-30 PROCEDURE — 84484 ASSAY OF TROPONIN QUANT: CPT | Performed by: INTERNAL MEDICINE

## 2025-04-30 PROCEDURE — 82570 ASSAY OF URINE CREATININE: CPT | Performed by: INTERNAL MEDICINE

## 2025-04-30 PROCEDURE — 36415 COLL VENOUS BLD VENIPUNCTURE: CPT | Performed by: INTERNAL MEDICINE

## 2025-04-30 PROCEDURE — 1126F AMNT PAIN NOTED NONE PRSNT: CPT | Performed by: INTERNAL MEDICINE

## 2025-04-30 PROCEDURE — 80053 COMPREHEN METABOLIC PANEL: CPT | Performed by: INTERNAL MEDICINE

## 2025-04-30 PROCEDURE — 99214 OFFICE O/P EST MOD 30 MIN: CPT | Mod: 25 | Performed by: INTERNAL MEDICINE

## 2025-04-30 PROCEDURE — 83036 HEMOGLOBIN GLYCOSYLATED A1C: CPT | Performed by: INTERNAL MEDICINE

## 2025-04-30 RX ORDER — BUDESONIDE AND FORMOTEROL FUMARATE DIHYDRATE 160; 4.5 UG/1; UG/1
2 AEROSOL RESPIRATORY (INHALATION) 2 TIMES DAILY
Qty: 10.2 G | Refills: 1 | Status: SHIPPED | OUTPATIENT
Start: 2025-04-30

## 2025-04-30 RX ORDER — AMLODIPINE BESYLATE 5 MG/1
5 TABLET ORAL DAILY
Qty: 90 TABLET | Refills: 0 | Status: SHIPPED | OUTPATIENT
Start: 2025-04-30

## 2025-04-30 RX ORDER — METFORMIN HYDROCHLORIDE 500 MG/1
500 TABLET, EXTENDED RELEASE ORAL
Qty: 90 TABLET | Refills: 1 | Status: SHIPPED | OUTPATIENT
Start: 2025-04-30

## 2025-04-30 RX ORDER — LOSARTAN POTASSIUM 50 MG/1
50 TABLET ORAL DAILY
Qty: 90 TABLET | Refills: 0 | Status: SHIPPED | OUTPATIENT
Start: 2025-04-30

## 2025-04-30 SDOH — HEALTH STABILITY: PHYSICAL HEALTH: ON AVERAGE, HOW MANY MINUTES DO YOU ENGAGE IN EXERCISE AT THIS LEVEL?: 0 MIN

## 2025-04-30 SDOH — HEALTH STABILITY: PHYSICAL HEALTH: ON AVERAGE, HOW MANY DAYS PER WEEK DO YOU ENGAGE IN MODERATE TO STRENUOUS EXERCISE (LIKE A BRISK WALK)?: 0 DAYS

## 2025-04-30 ASSESSMENT — SOCIAL DETERMINANTS OF HEALTH (SDOH): HOW OFTEN DO YOU GET TOGETHER WITH FRIENDS OR RELATIVES?: PATIENT DECLINED

## 2025-04-30 ASSESSMENT — PAIN SCALES - GENERAL: PAINLEVEL_OUTOF10: NO PAIN (0)

## 2025-04-30 NOTE — PROGRESS NOTES
Preventive Care Visit  [facility]  Kike Davidson MD, Internal Medicine  Apr 30, 2025      Assessment & Plan   Problem List Items Addressed This Visit       Depression with anxiety    Morbid obesity (H)    Cannabis dependence, abuse (H)     Other Visit Diagnoses       SANDOVAL (dyspnea on exertion)    -  Primary    Relevant Medications    budesonide-formoterol (SYMBICORT/BREYNA) 160-4.5 MCG/ACT Inhaler    Other Relevant Orders    EKG 12-lead complete w/read - Clinics (Completed)    Adult Cardiology Eval  Referral    Echocardiogram Complete    Echocardiogram Exercise Stress    General PFT Lab (Please always keep checked)    Chest tightness        Relevant Medications    budesonide-formoterol (SYMBICORT/BREYNA) 160-4.5 MCG/ACT Inhaler    Other Relevant Orders    Troponin T, High Sensitivity    CK total    Comprehensive metabolic panel (BMP + Alb, Alk Phos, ALT, AST, Total. Bili, TP)    Echocardiogram Exercise Stress    General PFT Lab (Please always keep checked)    Primary hypertension        Relevant Medications    losartan (COZAAR) 50 MG tablet    amLODIPine (NORVASC) 5 MG tablet    Other Relevant Orders    Comprehensive metabolic panel (BMP + Alb, Alk Phos, ALT, AST, Total. Bili, TP)    CBC with Platelets & Differential    UA with Microscopic reflex to Culture - lab collect    Albumin Random Urine Quantitative with Creat Ratio    Schizophrenia, unspecified type (H)        Anxiety and depression        Dyslipidemia        Relevant Orders    Lipid panel reflex to direct LDL Fasting    Screening for diabetes mellitus        Relevant Orders    Hemoglobin A1c    Visit for eye and vision exam        Relevant Orders    Adult Eye  Referral    Medically complex patient        Relevant Orders    Med Therapy Management Referral           Complaining of left upper chest pain radiates to the left shoulder.  More of soreness.  He reports it does hurt with exertion.  Will do stress echo echo  "and refer to cardiology EKG and check cardiac enzymes.  He does have some cough and shortness of breath as well as sometimes wheezing he does vape nicotine occasional cigars use could be an element of hyperactive airway disease can use Symbicort 2 puffs twice a day as needed for breathing issues.  Blood pressure elevated hypertensive range started on losartan 5 to 50 mg once daily in addition to amlodipine 5 mg once daily call doctors office with blood pressure readings for the next 10 days.  Schedule follow-up in 2 months.  Check EKG.  He follows with primary care for psychiatry is on high-dose risperidone and on mirtazapine and sertraline and trazodone at night check QT interval.  Continue follow-up with psychiatry recommendations.  He is on statins well-tolerated.  Continue with exercise and weight loss he has an appoint with bariatric surgery in June strongly recommend he follows with he will need an eye exam given that he is on risperidone.  Due for COVID-vaccine and the Tdap today.  Further recommendation pending lab results and specialty consult.  Established with MTM.  All questions answered.  Advised to cut down on vaping given his shortness of breath symptoms.  Patient has been advised of split billing requirements and indicates understanding: Yes       BMI  Estimated body mass index is 49.32 kg/m  as calculated from the following:    Height as of this encounter: 1.753 m (5' 9\").    Weight as of this encounter: 151.5 kg (334 lb).   Weight management plan: Discussed healthy diet and exercise guidelines    Counseling  Appropriate preventive services were addressed with this patient via screening, questionnaire, or discussion as appropriate for fall prevention, nutrition, physical activity, Tobacco-use cessation, social engagement, weight loss and cognition.  Checklist reviewing preventive services available has been given to the patient.  Reviewed patient's diet, addressing concerns and/or questions.   The " patient was instructed to see the dentist every 6 months.   He is at risk for psychosocial distress and has been provided with information to reduce risk.   Information on urinary incontinence and treatment options given to patient.         Cheri Geiger is a 39 year old, presenting for the following:  Physical, Chest Pain, and Cough         No data to display                    HPI    Advance Care Planning    Discussed advance care planning with patient; informed AVS has link to [facility].        4/30/2025   General Health   How would you rate your overall physical health? (!) FAIR   Feel stress (tense, anxious, or unable to sleep) To some extent   (!) STRESS CONCERN      4/30/2025   Nutrition   Diet: Regular (no restrictions)         4/30/2025   Exercise   Days per week of moderate/strenous exercise 0 days   Average minutes spent exercising at this level 0 min   (!) EXERCISE CONCERN      4/30/2025   Social Factors   Frequency of gathering with friends or relatives Patient declined   Worry food won't last until get money to buy more Yes   Food not last or not have enough money for food? Yes   Do you have housing? (Housing is defined as stable permanent housing and does not include staying outside in a car, in a tent, in an abandoned building, in an overnight shelter, or couch-surfing.) Yes   Are you worried about losing your housing? No   Lack of transportation? No   Unable to get utilities (heat,electricity)? No   (!) FOOD SECURITY CONCERN PRESENT      4/30/2025   Fall Risk   Fallen 2 or more times in the past year? No   Trouble with walking or balance? Yes   Gait Speed Test (Document in seconds) 3.5   Gait Speed Test Interpretation Less than or equal to 5.00 seconds - PASS          4/30/2025   Activities of Daily Living- Home Safety   Needs help with the following daily activites None of the above   Safety concerns in the home None of the above         4/30/2025   Dental   Dentist two times every  year? (!) NO         2025   Hearing Screening   Hearing concerns? None of the above         2025   Driving Risk Screening   Patient/family members have concerns about driving No         2025   General Alertness/Fatigue Screening   Have you been more tired than usual lately? No         2025   Urinary Incontinence Screening   Bothered by leaking urine in past 6 months Yes         Today's PHQ-2 Score:       2025     8:15 AM   PHQ-2 (  [employer])   Q1: Little interest or pleasure in doing things 1   Q2: Feeling down, depressed or hopeless 1   PHQ-2 Score 2    Q1: Little interest or pleasure in doing things Several days   Q2: Feeling down, depressed or hopeless Several days   PHQ-2 Score 2       Patient-reported           2025   Substance Use   Alcohol more than 3/day or more than 7/wk Not Applicable   Do you have a current opioid prescription? No   How severe/bad is pain from 1 to 10? 0/10 (No Pain)   Do you use any other substances recreationally? (!) CANNABIS PRODUCTS     Social History     Tobacco Use    Smoking status: Former     Current packs/day: 0.00     Types: Cigarettes     Quit date: 2008     Years since quittin.2    Smokeless tobacco: Never    Tobacco comments:     occasional   Vaping Use    Vaping status: Every Day    Substances: Nicotine   Substance Use Topics    Alcohol use: Yes     Comment: very occasionally    Drug use: Yes     Types: Marijuana     Comment: occasional             2025   Contraception/Family Planning   Questions about contraception or family planning No         Reviewed and updated as needed this visit by Provider      Problems               Past Medical History:   Diagnosis Date    Depression with anxiety     Mood disorder 6/10/2013    Other acne     Schizophrenia (H)     Suicidal ideation 2013     Past Surgical History:   Procedure Laterality Date    CIRCUMCISION      Adult 2013    [facility] APPENDECTOMY  11-    viral, not  supperative at surgery     Lab work is in process  Labs reviewed in EPIC  BP Readings from Last 3 Encounters:   25 (!) 148/85   24 (!) 152/92   21 133/80    Wt Readings from Last 3 Encounters:   25 (!) 151.5 kg (334 lb)   24 147.8 kg (325 lb 14.4 oz)   22 145.2 kg (320 lb)                  Patient Active Problem List   Diagnosis    Other acne    CARDIOVASCULAR SCREENING; LDL GOAL LESS THAN 160    Depression with anxiety    Morbid obesity (H)    Cannabis dependence, abuse (H)     Past Surgical History:   Procedure Laterality Date    CIRCUMCISION      Adult 2013    [facility] APPENDECTOMY  11-    viral, not supperative at surgery       Social History     Tobacco Use    Smoking status: Former     Current packs/day: 0.00     Types: Cigarettes     Quit date: 2008     Years since quittin.2    Smokeless tobacco: Never    Tobacco comments:     occasional   Substance Use Topics    Alcohol use: Yes     Comment: very occasionally     Family History   Problem Relation Age of Onset    Neurologic Disorder Mother         MS    Thyroid Disease Mother         hypothyroid    Coronary Artery Disease Mother 66        had heart attack and passed away from it    Anxiety Disorder Father     Hypertension Father     Coronary Artery Disease Maternal Grandmother     C.A.D. Maternal Grandfather         MI @ 43    Coronary Artery Disease Maternal Grandfather     Substance Abuse Maternal Uncle     Schizophrenia Paternal Uncle     Bipolar Disorder Paternal Uncle     Diabetes No family hx of     Cerebrovascular Disease No family hx of     Colon Cancer No family hx of     Prostate Cancer No family hx of          Current Outpatient Medications   Medication Sig Dispense Refill    amLODIPine (NORVASC) 5 MG tablet Take 1 tablet (5 mg) by mouth daily. 90 tablet 0    amLODIPine (NORVASC) 5 MG tablet Take 1 tablet (5 mg) by mouth daily. 90 tablet 3    atorvastatin (LIPITOR) 40 MG tablet TAKE 1 TABLET(40  MG) BY MOUTH DAILY 90 tablet 0    budesonide-formoterol (SYMBICORT/BREYNA) 160-4.5 MCG/ACT Inhaler Inhale 2 puffs into the lungs 2 times daily. 10.2 g 1    finasteride (PROPECIA) 1 MG tablet Take by mouth daily      gabapentin (NEURONTIN) 300 MG capsule Take 1 capsule by mouth in morning AND 4 capsules at bedtime daily, AND 1 more capsule if needed once daily. APPOINTMENT NEEDED FOR REFILLS.*      losartan (COZAAR) 50 MG tablet Take 1 tablet (50 mg) by mouth daily. 90 tablet 0    metFORMIN (GLUCOPHAGE) 500 MG tablet Take one (1) tablet twice a day each with a meal NEEDS TO BE SEEN FOR FUTURE REFILLS*      mirtazapine (REMERON SOL-TAB) 15 MG ODT 30 mg by Orally disintegrating tablet route at bedtime      nicotine (NICORETTE) 2 MG gum Place 1 each (2 mg) inside cheek every hour as needed for nicotine withdrawal symptoms 150 each 3    risperiDONE (RISPERDAL) 1 MG tablet Take 8 mg by mouth daily      sertraline (ZOLOFT) 100 MG tablet TAKE TWO TABLETS BY MOUTH DAILY.  PLEASE BE SEEN IN CLINIC FOR NEXT REFILL.      traZODone (DESYREL) 50 MG tablet Take 1-2 tablets ( mg) by mouth nightly as needed for sleep 60 tablet 0     Allergies   Allergen Reactions    No Known Drug Allergy      Recent Labs   Lab Test 04/30/24  1025 05/14/21  1130 12/20/19  1200 05/29/19  1042   A1C 5.6  --   --   --    * 101* 102* 58   HDL 39* 40 33* 31*   TRIG 172* 221* 339* 132   ALT 48 41  --  36   CR 0.92 0.85 0.94 1.14   GFRESTIMATED >90 >90 >90 85   GFRESTBLACK  --  >90 >90 >90   POTASSIUM 4.5 4.0 4.0 3.9   TSH 1.58  --  3.60 4.86*           Current providers sharing in care for this patient include:  Patient Care Team:  Kike Davidson MD as PCP - General (Internal Medicine)  Jaquelin Caceres DNP as Assigned PCP    The following health maintenance items are reviewed in Epic and correct as of today:  Health Maintenance   Topic Date Due    HEPATITIS C SCREENING  Never done    DTAP/TDAP/TD IMMUNIZATION (10 - Td or Tdap) 07/22/2024  "   INFLUENZA VACCINE (1) 09/01/2024    COVID-19 Vaccine (6 - 2024-25 season) 09/01/2024    BMP  04/30/2025    LIPID  04/30/2025    ANNUAL REVIEW OF HM ORDERS  04/30/2025    MEDICARE ANNUAL WELLNESS VISIT  04/30/2026    DIABETES SCREENING  04/30/2027    ADVANCE CARE PLANNING  04/30/2030    ZOSTER IMMUNIZATION (1 of 2) 05/29/2037    HIV SCREENING  Completed    PHQ-2 (once per calendar year)  Completed    HEPATITIS B IMMUNIZATION  Completed    Pneumococcal Vaccine: Pediatrics (0 to 5 Years) and At-Risk Patients (6 to 49 Years)  Aged Out    HPV IMMUNIZATION  Aged Out    MENINGITIS IMMUNIZATION  Aged Out         Review of Systems  Constitutional, neuro, ENT, endocrine, pulmonary, cardiac, gastrointestinal, genitourinary, musculoskeletal, integument and psychiatric systems are negative, except as otherwise noted.     Objective    Exam  BP (!) 148/85   Pulse 85   Temp 98.6  F (37  C) (Temporal)   Resp 16   Ht 1.753 m (5' 9\")   Wt (!) 151.5 kg (334 lb)   SpO2 98%   BMI 49.32 kg/m     Estimated body mass index is 49.32 kg/m  as calculated from the following:    Height as of this encounter: 1.753 m (5' 9\").    Weight as of this encounter: 151.5 kg (334 lb).    Physical Exam  GENERAL: alert and no distress  EYES: Eyes grossly normal to inspection, PERRL and conjunctivae and sclerae normal  HENT: ear canals and TM's normal, nose and mouth without ulcers or lesions  NECK: no adenopathy, no asymmetry, masses, or scars  RESP: lungs clear to auscultation - no rales, rhonchi or wheezes  CV: regular rate and rhythm, normal S1 S2, no S3 or S4, no murmur, click or rub, no peripheral edema  ABDOMEN: soft, nontender, no hepatosplenomegaly, no masses and bowel sounds normal  MS: no gross musculoskeletal defects noted, no edema  SKIN: no suspicious lesions or rashes  NEURO: Normal strength and tone, mentation intact and speech normal  PSYCH: mentation appears normal, affect normal/bright        4/30/2024   Mini Cog   Clock Draw " Score 2 Normal   3 Item Recall 3 objects recalled   Mini Cog Total Score 5              Signed Electronically by: Kike Davidson MD

## 2025-05-01 ENCOUNTER — PATIENT OUTREACH (OUTPATIENT)
Dept: CARE COORDINATION | Facility: CLINIC | Age: 40
End: 2025-05-01
Payer: MEDICARE

## 2025-05-01 NOTE — TELEPHONE ENCOUNTER
Patient Contact     Attempt # 1     Was call answered?  No.  Left message on voicemail with information to call triage back.     On callback please clarify which medication pt is requesting.     Sandip Massey RN  [facility]

## 2025-05-02 NOTE — TELEPHONE ENCOUNTER
Called to clarify with patient. Medication being discussed is Symbicort, NOT lyrica.     Catarina Skinner RN

## 2025-05-05 ENCOUNTER — PATIENT OUTREACH (OUTPATIENT)
Dept: CARE COORDINATION | Facility: CLINIC | Age: 40
End: 2025-05-05
Payer: MEDICARE

## 2025-05-08 DIAGNOSIS — R06.09 DOE (DYSPNEA ON EXERTION): Primary | ICD-10-CM

## 2025-05-08 RX ORDER — ALBUTEROL SULFATE 90 UG/1
2 INHALANT RESPIRATORY (INHALATION) EVERY 6 HOURS PRN
Qty: 18 G | Refills: 0 | Status: SHIPPED | OUTPATIENT
Start: 2025-05-08

## 2025-05-19 DIAGNOSIS — Z71.6 ENCOUNTER FOR TOBACCO USE CESSATION COUNSELING: ICD-10-CM

## 2025-05-19 DIAGNOSIS — Z71.6 ENCOUNTER FOR SMOKING CESSATION COUNSELING: Primary | ICD-10-CM

## 2025-05-21 ENCOUNTER — TELEPHONE (OUTPATIENT)
Dept: PHARMACY | Facility: CLINIC | Age: 40
End: 2025-05-21
Payer: MEDICARE

## 2025-05-21 NOTE — TELEPHONE ENCOUNTER
MTM referral from: [facility] visit (referral by provider)    MTM referral outreach attempt #2 on May 21, 2025 at 1:19 PM      Outcome: Patient not reachable after several attempts, sent [employer] message    Use VBC,  CS for the carrier/Plan on the flowsheet      [employer] Message Sent    Bridget Cabrera [facility]  -[facility]  [phone]

## 2025-07-14 DIAGNOSIS — E78.5 HYPERLIPIDEMIA, UNSPECIFIED HYPERLIPIDEMIA TYPE: ICD-10-CM

## 2025-07-14 RX ORDER — ATORVASTATIN CALCIUM 40 MG/1
40 TABLET, FILM COATED ORAL DAILY
Qty: 90 TABLET | Refills: 2 | Status: SHIPPED | OUTPATIENT
Start: 2025-07-14

## 2025-07-19 DIAGNOSIS — I10 PRIMARY HYPERTENSION: ICD-10-CM

## 2025-07-21 RX ORDER — LOSARTAN POTASSIUM 50 MG/1
50 TABLET ORAL DAILY
Qty: 90 TABLET | Refills: 0 | Status: SHIPPED | OUTPATIENT
Start: 2025-07-21

## 2025-08-07 ENCOUNTER — HOSPITAL ENCOUNTER (OUTPATIENT)
Dept: CARDIOLOGY | Facility: CLINIC | Age: 40
End: 2025-08-07
Attending: INTERNAL MEDICINE
Payer: MEDICARE

## 2025-08-07 DIAGNOSIS — R06.09 DOE (DYSPNEA ON EXERTION): ICD-10-CM

## 2025-08-07 DIAGNOSIS — R07.89 CHEST TIGHTNESS: ICD-10-CM

## 2025-08-07 PROCEDURE — 255N000002 HC RX 255 OP 636: Performed by: INTERNAL MEDICINE

## 2025-08-07 PROCEDURE — 93325 DOPPLER ECHO COLOR FLOW MAPG: CPT | Mod: TC

## 2025-08-07 RX ADMIN — HUMAN ALBUMIN MICROSPHERES AND PERFLUTREN 9 ML (DILUTED): 10; .22 INJECTION, SOLUTION INTRAVENOUS at 11:16
